# Patient Record
Sex: FEMALE | Race: WHITE | HISPANIC OR LATINO | URBAN - METROPOLITAN AREA
[De-identification: names, ages, dates, MRNs, and addresses within clinical notes are randomized per-mention and may not be internally consistent; named-entity substitution may affect disease eponyms.]

---

## 2018-08-17 ENCOUNTER — INPATIENT (INPATIENT)
Facility: HOSPITAL | Age: 83
LOS: 3 days | Discharge: HOME HEALTH | End: 2018-08-21
Attending: INTERNAL MEDICINE | Admitting: INTERNAL MEDICINE
Payer: MEDICARE

## 2018-08-17 VITALS
DIASTOLIC BLOOD PRESSURE: 62 MMHG | SYSTOLIC BLOOD PRESSURE: 106 MMHG | RESPIRATION RATE: 18 BRPM | HEART RATE: 91 BPM | TEMPERATURE: 98 F | OXYGEN SATURATION: 99 %

## 2018-08-17 DIAGNOSIS — K92.2 GASTROINTESTINAL HEMORRHAGE, UNSPECIFIED: ICD-10-CM

## 2018-08-17 DIAGNOSIS — Z96.649 PRESENCE OF UNSPECIFIED ARTIFICIAL HIP JOINT: Chronic | ICD-10-CM

## 2018-08-17 DIAGNOSIS — D25.9 LEIOMYOMA OF UTERUS, UNSPECIFIED: Chronic | ICD-10-CM

## 2018-08-17 LAB
ALBUMIN SERPL ELPH-MCNC: 3.9 G/DL — SIGNIFICANT CHANGE UP (ref 3.3–5)
ALP SERPL-CCNC: 54 U/L — SIGNIFICANT CHANGE UP (ref 40–120)
ALT FLD-CCNC: 11 U/L — SIGNIFICANT CHANGE UP (ref 4–33)
APTT BLD: 33.7 SEC — SIGNIFICANT CHANGE UP (ref 27.5–37.4)
AST SERPL-CCNC: 17 U/L — SIGNIFICANT CHANGE UP (ref 4–32)
BASE EXCESS BLDV CALC-SCNC: 2.3 MMOL/L — SIGNIFICANT CHANGE UP
BASOPHILS # BLD AUTO: 0.02 K/UL — SIGNIFICANT CHANGE UP (ref 0–0.2)
BASOPHILS NFR BLD AUTO: 0.2 % — SIGNIFICANT CHANGE UP (ref 0–2)
BILIRUB SERPL-MCNC: 0.5 MG/DL — SIGNIFICANT CHANGE UP (ref 0.2–1.2)
BLD GP AB SCN SERPL QL: NEGATIVE — SIGNIFICANT CHANGE UP
BLOOD GAS VENOUS - CREATININE: 0.68 MG/DL — SIGNIFICANT CHANGE UP (ref 0.5–1.3)
BUN SERPL-MCNC: 16 MG/DL — SIGNIFICANT CHANGE UP (ref 7–23)
CALCIUM SERPL-MCNC: 9.2 MG/DL — SIGNIFICANT CHANGE UP (ref 8.4–10.5)
CHLORIDE BLDV-SCNC: 107 MMOL/L — SIGNIFICANT CHANGE UP (ref 96–108)
CHLORIDE SERPL-SCNC: 100 MMOL/L — SIGNIFICANT CHANGE UP (ref 98–107)
CO2 SERPL-SCNC: 24 MMOL/L — SIGNIFICANT CHANGE UP (ref 22–31)
CREAT SERPL-MCNC: 0.85 MG/DL — SIGNIFICANT CHANGE UP (ref 0.5–1.3)
EOSINOPHIL # BLD AUTO: 0 K/UL — SIGNIFICANT CHANGE UP (ref 0–0.5)
EOSINOPHIL NFR BLD AUTO: 0 % — SIGNIFICANT CHANGE UP (ref 0–6)
GAS PNL BLDV: 135 MMOL/L — LOW (ref 136–146)
GLUCOSE BLDV-MCNC: 110 — HIGH (ref 70–99)
GLUCOSE SERPL-MCNC: 111 MG/DL — HIGH (ref 70–99)
HCO3 BLDV-SCNC: 25 MMOL/L — SIGNIFICANT CHANGE UP (ref 20–27)
HCT VFR BLD CALC: 34.7 % — SIGNIFICANT CHANGE UP (ref 34.5–45)
HCT VFR BLD CALC: 38.5 % — SIGNIFICANT CHANGE UP (ref 34.5–45)
HCT VFR BLD CALC: 40.9 % — SIGNIFICANT CHANGE UP (ref 34.5–45)
HCT VFR BLDV CALC: 41.1 % — SIGNIFICANT CHANGE UP (ref 34.5–45)
HGB BLD-MCNC: 11.1 G/DL — LOW (ref 11.5–15.5)
HGB BLD-MCNC: 12.4 G/DL — SIGNIFICANT CHANGE UP (ref 11.5–15.5)
HGB BLD-MCNC: 13.1 G/DL — SIGNIFICANT CHANGE UP (ref 11.5–15.5)
HGB BLDV-MCNC: 13.4 G/DL — SIGNIFICANT CHANGE UP (ref 11.5–15.5)
IMM GRANULOCYTES # BLD AUTO: 0.03 # — SIGNIFICANT CHANGE UP
IMM GRANULOCYTES NFR BLD AUTO: 0.3 % — SIGNIFICANT CHANGE UP (ref 0–1.5)
INR BLD: 1.07 — SIGNIFICANT CHANGE UP (ref 0.88–1.17)
LACTATE BLDV-MCNC: 3.3 MMOL/L — HIGH (ref 0.5–2)
LYMPHOCYTES # BLD AUTO: 1.85 K/UL — SIGNIFICANT CHANGE UP (ref 1–3.3)
LYMPHOCYTES # BLD AUTO: 18.4 % — SIGNIFICANT CHANGE UP (ref 13–44)
MCHC RBC-ENTMCNC: 27.5 PG — SIGNIFICANT CHANGE UP (ref 27–34)
MCHC RBC-ENTMCNC: 28 PG — SIGNIFICANT CHANGE UP (ref 27–34)
MCHC RBC-ENTMCNC: 28.1 PG — SIGNIFICANT CHANGE UP (ref 27–34)
MCHC RBC-ENTMCNC: 32 % — SIGNIFICANT CHANGE UP (ref 32–36)
MCHC RBC-ENTMCNC: 32 % — SIGNIFICANT CHANGE UP (ref 32–36)
MCHC RBC-ENTMCNC: 32.2 % — SIGNIFICANT CHANGE UP (ref 32–36)
MCV RBC AUTO: 85.9 FL — SIGNIFICANT CHANGE UP (ref 80–100)
MCV RBC AUTO: 86.9 FL — SIGNIFICANT CHANGE UP (ref 80–100)
MCV RBC AUTO: 87.8 FL — SIGNIFICANT CHANGE UP (ref 80–100)
MONOCYTES # BLD AUTO: 0.73 K/UL — SIGNIFICANT CHANGE UP (ref 0–0.9)
MONOCYTES NFR BLD AUTO: 7.3 % — SIGNIFICANT CHANGE UP (ref 2–14)
NEUTROPHILS # BLD AUTO: 7.43 K/UL — HIGH (ref 1.8–7.4)
NEUTROPHILS NFR BLD AUTO: 73.8 % — SIGNIFICANT CHANGE UP (ref 43–77)
NRBC # FLD: 0 — SIGNIFICANT CHANGE UP
PCO2 BLDV: 51 MMHG — SIGNIFICANT CHANGE UP (ref 41–51)
PH BLDV: 7.35 PH — SIGNIFICANT CHANGE UP (ref 7.32–7.43)
PLATELET # BLD AUTO: 160 K/UL — SIGNIFICANT CHANGE UP (ref 150–400)
PLATELET # BLD AUTO: 172 K/UL — SIGNIFICANT CHANGE UP (ref 150–400)
PLATELET # BLD AUTO: 176 K/UL — SIGNIFICANT CHANGE UP (ref 150–400)
PMV BLD: 12.1 FL — SIGNIFICANT CHANGE UP (ref 7–13)
PMV BLD: 12.3 FL — SIGNIFICANT CHANGE UP (ref 7–13)
PMV BLD: 12.4 FL — SIGNIFICANT CHANGE UP (ref 7–13)
PO2 BLDV: 29 MMHG — LOW (ref 35–40)
POTASSIUM BLDV-SCNC: 4 MMOL/L — SIGNIFICANT CHANGE UP (ref 3.4–4.5)
POTASSIUM SERPL-MCNC: 4.4 MMOL/L — SIGNIFICANT CHANGE UP (ref 3.5–5.3)
POTASSIUM SERPL-SCNC: 4.4 MMOL/L — SIGNIFICANT CHANGE UP (ref 3.5–5.3)
PROT SERPL-MCNC: 6.4 G/DL — SIGNIFICANT CHANGE UP (ref 6–8.3)
PROTHROM AB SERPL-ACNC: 11.9 SEC — SIGNIFICANT CHANGE UP (ref 9.8–13.1)
RBC # BLD: 4.04 M/UL — SIGNIFICANT CHANGE UP (ref 3.8–5.2)
RBC # BLD: 4.43 M/UL — SIGNIFICANT CHANGE UP (ref 3.8–5.2)
RBC # BLD: 4.66 M/UL — SIGNIFICANT CHANGE UP (ref 3.8–5.2)
RBC # FLD: 13.6 % — SIGNIFICANT CHANGE UP (ref 10.3–14.5)
RBC # FLD: 13.6 % — SIGNIFICANT CHANGE UP (ref 10.3–14.5)
RBC # FLD: 13.7 % — SIGNIFICANT CHANGE UP (ref 10.3–14.5)
RH IG SCN BLD-IMP: POSITIVE — SIGNIFICANT CHANGE UP
SAO2 % BLDV: 49 % — LOW (ref 60–85)
SODIUM SERPL-SCNC: 140 MMOL/L — SIGNIFICANT CHANGE UP (ref 135–145)
WBC # BLD: 10.06 K/UL — SIGNIFICANT CHANGE UP (ref 3.8–10.5)
WBC # BLD: 8.01 K/UL — SIGNIFICANT CHANGE UP (ref 3.8–10.5)
WBC # BLD: 8.18 K/UL — SIGNIFICANT CHANGE UP (ref 3.8–10.5)
WBC # FLD AUTO: 10.06 K/UL — SIGNIFICANT CHANGE UP (ref 3.8–10.5)
WBC # FLD AUTO: 8.01 K/UL — SIGNIFICANT CHANGE UP (ref 3.8–10.5)
WBC # FLD AUTO: 8.18 K/UL — SIGNIFICANT CHANGE UP (ref 3.8–10.5)

## 2018-08-17 PROCEDURE — 99223 1ST HOSP IP/OBS HIGH 75: CPT | Mod: GC

## 2018-08-17 RX ORDER — CIPROFLOXACIN LACTATE 400MG/40ML
VIAL (ML) INTRAVENOUS
Qty: 0 | Refills: 0 | Status: DISCONTINUED | OUTPATIENT
Start: 2018-08-17 | End: 2018-08-21

## 2018-08-17 RX ORDER — CIPROFLOXACIN LACTATE 400MG/40ML
400 VIAL (ML) INTRAVENOUS ONCE
Qty: 0 | Refills: 0 | Status: COMPLETED | OUTPATIENT
Start: 2018-08-17 | End: 2018-08-17

## 2018-08-17 RX ORDER — SODIUM CHLORIDE 9 MG/ML
1000 INJECTION, SOLUTION INTRAVENOUS
Qty: 0 | Refills: 0 | Status: DISCONTINUED | OUTPATIENT
Start: 2018-08-17 | End: 2018-08-19

## 2018-08-17 RX ORDER — DEXTROSE 50 % IN WATER 50 %
12.5 SYRINGE (ML) INTRAVENOUS ONCE
Qty: 0 | Refills: 0 | Status: DISCONTINUED | OUTPATIENT
Start: 2018-08-17 | End: 2018-08-19

## 2018-08-17 RX ORDER — METRONIDAZOLE 500 MG
500 TABLET ORAL EVERY 8 HOURS
Qty: 0 | Refills: 0 | Status: DISCONTINUED | OUTPATIENT
Start: 2018-08-17 | End: 2018-08-21

## 2018-08-17 RX ORDER — INSULIN LISPRO 100/ML
VIAL (ML) SUBCUTANEOUS EVERY 6 HOURS
Qty: 0 | Refills: 0 | Status: DISCONTINUED | OUTPATIENT
Start: 2018-08-17 | End: 2018-08-18

## 2018-08-17 RX ORDER — METRONIDAZOLE 500 MG
500 TABLET ORAL ONCE
Qty: 0 | Refills: 0 | Status: COMPLETED | OUTPATIENT
Start: 2018-08-17 | End: 2018-08-17

## 2018-08-17 RX ORDER — DEXTROSE 50 % IN WATER 50 %
25 SYRINGE (ML) INTRAVENOUS ONCE
Qty: 0 | Refills: 0 | Status: DISCONTINUED | OUTPATIENT
Start: 2018-08-17 | End: 2018-08-21

## 2018-08-17 RX ORDER — PANTOPRAZOLE SODIUM 20 MG/1
40 TABLET, DELAYED RELEASE ORAL ONCE
Qty: 0 | Refills: 0 | Status: COMPLETED | OUTPATIENT
Start: 2018-08-17 | End: 2018-08-17

## 2018-08-17 RX ORDER — DEXTROSE 50 % IN WATER 50 %
15 SYRINGE (ML) INTRAVENOUS ONCE
Qty: 0 | Refills: 0 | Status: DISCONTINUED | OUTPATIENT
Start: 2018-08-17 | End: 2018-08-17

## 2018-08-17 RX ORDER — SODIUM CHLORIDE 9 MG/ML
1000 INJECTION, SOLUTION INTRAVENOUS
Qty: 0 | Refills: 0 | Status: DISCONTINUED | OUTPATIENT
Start: 2018-08-17 | End: 2018-08-18

## 2018-08-17 RX ORDER — METRONIDAZOLE 500 MG
TABLET ORAL
Qty: 0 | Refills: 0 | Status: DISCONTINUED | OUTPATIENT
Start: 2018-08-17 | End: 2018-08-21

## 2018-08-17 RX ORDER — SODIUM CHLORIDE 9 MG/ML
1000 INJECTION, SOLUTION INTRAVENOUS
Qty: 0 | Refills: 0 | Status: DISCONTINUED | OUTPATIENT
Start: 2018-08-17 | End: 2018-08-20

## 2018-08-17 RX ORDER — ENOXAPARIN SODIUM 100 MG/ML
40 INJECTION SUBCUTANEOUS DAILY
Qty: 0 | Refills: 0 | Status: DISCONTINUED | OUTPATIENT
Start: 2018-08-17 | End: 2018-08-17

## 2018-08-17 RX ORDER — DEXTROSE 50 % IN WATER 50 %
15 SYRINGE (ML) INTRAVENOUS ONCE
Qty: 0 | Refills: 0 | Status: COMPLETED | OUTPATIENT
Start: 2018-08-17 | End: 2018-08-17

## 2018-08-17 RX ORDER — SODIUM CHLORIDE 9 MG/ML
1000 INJECTION, SOLUTION INTRAVENOUS
Qty: 0 | Refills: 0 | Status: DISCONTINUED | OUTPATIENT
Start: 2018-08-17 | End: 2018-08-17

## 2018-08-17 RX ORDER — DEXTROSE 50 % IN WATER 50 %
15 SYRINGE (ML) INTRAVENOUS ONCE
Qty: 0 | Refills: 0 | Status: DISCONTINUED | OUTPATIENT
Start: 2018-08-17 | End: 2018-08-19

## 2018-08-17 RX ORDER — DEXTROSE 50 % IN WATER 50 %
25 SYRINGE (ML) INTRAVENOUS ONCE
Qty: 0 | Refills: 0 | Status: DISCONTINUED | OUTPATIENT
Start: 2018-08-17 | End: 2018-08-19

## 2018-08-17 RX ORDER — CIPROFLOXACIN LACTATE 400MG/40ML
400 VIAL (ML) INTRAVENOUS EVERY 12 HOURS
Qty: 0 | Refills: 0 | Status: DISCONTINUED | OUTPATIENT
Start: 2018-08-17 | End: 2018-08-21

## 2018-08-17 RX ORDER — GLUCAGON INJECTION, SOLUTION 0.5 MG/.1ML
1 INJECTION, SOLUTION SUBCUTANEOUS ONCE
Qty: 0 | Refills: 0 | Status: DISCONTINUED | OUTPATIENT
Start: 2018-08-17 | End: 2018-08-19

## 2018-08-17 RX ADMIN — Medication 200 MILLIGRAM(S): at 23:35

## 2018-08-17 RX ADMIN — SODIUM CHLORIDE 125 MILLILITER(S): 9 INJECTION, SOLUTION INTRAVENOUS at 21:23

## 2018-08-17 RX ADMIN — Medication 100 MILLIGRAM(S): at 21:18

## 2018-08-17 RX ADMIN — PANTOPRAZOLE SODIUM 40 MILLIGRAM(S): 20 TABLET, DELAYED RELEASE ORAL at 05:55

## 2018-08-17 RX ADMIN — Medication 100 MILLIGRAM(S): at 12:55

## 2018-08-17 RX ADMIN — SODIUM CHLORIDE 50 MILLILITER(S): 9 INJECTION, SOLUTION INTRAVENOUS at 19:11

## 2018-08-17 RX ADMIN — SODIUM CHLORIDE 125 MILLILITER(S): 9 INJECTION, SOLUTION INTRAVENOUS at 11:16

## 2018-08-17 RX ADMIN — Medication 15 GRAM(S): at 13:04

## 2018-08-17 RX ADMIN — Medication 200 MILLIGRAM(S): at 12:55

## 2018-08-17 RX ADMIN — SODIUM CHLORIDE 125 MILLILITER(S): 9 INJECTION, SOLUTION INTRAVENOUS at 10:35

## 2018-08-17 NOTE — H&P ADULT - NSHPPHYSICALEXAM_GEN_ALL_CORE
Vital Signs Last 24 Hrs  T(C): 37.1 (17 Aug 2018 02:39), Max: 37.1 (17 Aug 2018 02:39)  T(F): 98.7 (17 Aug 2018 02:39), Max: 98.7 (17 Aug 2018 02:39)  HR: 91 (17 Aug 2018 00:28) (91 - 91)  BP: 102/55 (17 Aug 2018 02:39) (102/55 - 106/62)  BP(mean): --  RR: 18 (17 Aug 2018 02:39) (18 - 18)  SpO2: 97% (17 Aug 2018 02:39) (97% - 99%)  Daily     Daily     Exam:  General: awake, alert, NAD  HEENT: NCAT, MMM  Resp: nonlabored  Chest: equal chest rise  Abd: soft, NT, ND  Ext: CHA  Neuro: intact  Rectal: good tone, soft stool in vault, small amount of dark red blood

## 2018-08-17 NOTE — H&P ADULT - ASSESSMENT
89yo F transferred from [facility] for lower GI bleed, hemodynamically stable    - NPO, IVF  - please check stat labs  - IR consult for positive CTA  - GI consult  - dispo pending further work up    will discuss with Dr. Edwar PINEDA team surgery  [id number]

## 2018-08-17 NOTE — CONSULT NOTE ADULT - ASSESSMENT
89yo F transferred from [facility] for lower GI bleed, hemodynamically stable    - NPO, IVF  - please check stat labs  - IR consult for positive CTA  - GI consult  - dispo pending further work up    will discuss with Dr. Edwar PINEDA team surgery  [phone]

## 2018-08-17 NOTE — CONSULT NOTE ADULT - ATTENDING COMMENTS
I saw and examined the patient and agree with the above note.  -Descending colonic bleed with a relatively stable hemodynamics. She does have hypertension at baseline so "normotensive" may be low for her. She will be admitted for serial hematocrits, frequent vitals and discussion of IR or surgical intervention if needed.   -DM AC/HS, corrective sale as needed  - HTN hold antihypertensives    I spent 115min reviewing history, data, images and in discussion/coordination of care.     Kashmir Vann MD (Cell: [phone])  Acute and Critical Care Surgery    The Acute Care Surgery (B Team) Attending Group Practice:  Dr. Tk Bryant, Dr. Tanya Barker, Dr. Jose Spring, Dr. Lashell Larkin, Dr. Kashmir Vann    Urgent issues - spectra [phone] or [phone]  Nonurgent issues - [phone]  Patient appointments or after hours - [phone]

## 2018-08-17 NOTE — ED ADULT NURSE NOTE - CHIEF COMPLAINT QUOTE
Pt BIBA from [place] for gi bleed.  Pt is primarily Canadian speaking.  pacific  used Id # [id number].  HGB 15.6 at [place].  Pt c/o "RLQ burning pain."  Last BM 1 pm with BRBPR as per pt.  Pt b/l 20g AC.  Pt NAD

## 2018-08-17 NOTE — H&P ADULT - NSHPLABSRESULTS_GEN_ALL_CORE
13.1   10.06 )-----------( 176      ( 17 Aug 2018 04:40 )             40.9     08-17    140  |  100  |  16  ----------------------------<  111<H>  4.4   |  24  |  0.85    Ca    9.2      17 Aug 2018 04:40    TPro  6.4  /  Alb  3.9  /  TBili  0.5  /  DBili  x   /  AST  17  /  ALT  11  /  AlkPhos  54  08-17    PT/INR - ( 17 Aug 2018 04:40 )   PT: 11.9 SEC;   INR: 1.07          PTT - ( 17 Aug 2018 04:40 )  PTT:33.7 SEC      IMAGING STUDIES:

## 2018-08-17 NOTE — ED ADULT TRIAGE NOTE - CHIEF COMPLAINT QUOTE
Pt BIBA from [place] for gi bleed.  Pt is primarily Macanese speaking.  pacific  used Id # [id number].  HGB 15.6 at [place].  Pt c/o "RLQ burning pain."  Last BM 1 pm with BRBPR as per pt.  Pt b/l 20g AC.  Pt NAD

## 2018-08-17 NOTE — PROVIDER CONTACT NOTE (OTHER) - ASSESSMENT
Patient is A&Ox 4, denies pain/SOB. VS stable. Out of bed, ambulating with x1 person assistance. Pt is NPO

## 2018-08-17 NOTE — ED PROVIDER NOTE - CARE PLAN
Principal Discharge DX:	GIB (gastrointestinal bleeding)  Secondary Diagnosis:	Colitis  Secondary Diagnosis:	Diverticulitis

## 2018-08-17 NOTE — H&P ADULT - HISTORY OF PRESENT ILLNESS
Palestinian speaking - Pacific interpreters used    89yo F with PMH of htn and DM presents as a transfer from [place] for IR Evaluation due to CTA positive for lower GI bleed. Pt reports a bloody BM yesterday morning so went to OSH. No lightheadedness, shortness of breath, chest pain, nausea, vomiting, fever, chills, sick contacts. No history of GI bleed in the past and no previous colonoscopy. No abdominal pain but patient reports feeling bloated and gassy 4 days ago. She took alkaseltzer with no improvement. This morning patient saw blood in toilet so went to [facility] ED. work up there revealed WBC 5.4, H/h 15.6/50, platelets 193. CTA done showing colitis, possible diverticulitis, and GI bleed from descending colon. Surgery notified of patients arrival at 4:30a.    MEDICATIONS  home: something for BP and diabetes unsure of what. no aspirin, plavix or anticoagulants

## 2018-08-17 NOTE — CONSULT NOTE ADULT - SUBJECTIVE AND OBJECTIVE BOX
Malaysian speaking - Pacific interpreters used    89yo F with PMH of htn and DM presents as a transfer from [place] for IR Evaluation due to CTA positive for lower GI bleed. Pt reports a bloody BM yesterday morning so went to OSH. No lightheadedness, shortness of breath, chest pain, nausea, vomiting, fever, chills, sick contacts. Danish speaking - Pacific interpreters used    89yo F with PMH of htn and DM presents as a transfer from [place] for IR Evaluation due to CTA positive for lower GI bleed. Pt reports a bloody BM yesterday morning so went to OSH. No lightheadedness, shortness of breath, chest pain, nausea, vomiting, fever, chills, sick contacts. No history of GI bleed in the past and no previous colonoscopy. No abdominal pain but patient reports feeling bloated and gassy 4 days ago. She took alkaseltzer with no improvement. This morning patient saw blood in toilet so went to [facility] ED. work up there revealed WBC 5.4, H/h 15.6/50, platelets 193. CTA done showing colitis, possible diverticulitis, and GI bleed from descending colon. Surgery notified of patients arrival at 4:30a.      PAST MEDICAL & SURGICAL HISTORY:  DM, HTN, hip replacement, fibroid rsxn  [  ] No significant past history as reviewed with the patient and family    FAMILY HISTORY:    [  ] Family history not pertinent as reviewed with the patient and family    SOCIAL HISTORY:     MEDICATIONS  (STANDING):  pantoprazole  Injectable 40 milliGRAM(s) IV Push Once    MEDICATIONS  home: something for BP and diabetes unsure of what. no aspirin, plavix or anticoagulants    Allergies    penicillin (Unknown)    Intolerances        Vital Signs Last 24 Hrs  T(C): 37.1 (17 Aug 2018 02:39), Max: 37.1 (17 Aug 2018 02:39)  T(F): 98.7 (17 Aug 2018 02:39), Max: 98.7 (17 Aug 2018 02:39)  HR: 91 (17 Aug 2018 00:28) (91 - 91)  BP: 102/55 (17 Aug 2018 02:39) (102/55 - 106/62)  BP(mean): --  RR: 18 (17 Aug 2018 02:39) (18 - 18)  SpO2: 97% (17 Aug 2018 02:39) (97% - 99%)  Daily     Daily     Exam:  General: awake, alert, NAD  HEENT: NCAT, MMM  Resp: nonlabored  Chest: equal chest rise  Abd: soft, NT, ND  Ext: CHA  Neuro: intact  Rectal: good tone, soft stool in vault, small amount of dark red blood                          13.1   10.06 )-----------( 176      ( 17 Aug 2018 04:40 )             40.9                 IMAGING STUDIES:

## 2018-08-17 NOTE — ED PROVIDER NOTE - MEDICAL DECISION MAKING DETAILS
89y Female with a GI bleed sent from [place] for IR intervention, vitals stable, no lab abnormalities.  Consult IR, admit.

## 2018-08-17 NOTE — ED PROVIDER NOTE - PROGRESS NOTE DETAILS
Crescencio Barakat, PGY-1 EM: Patients vitals are stable, H/H 13.1/40.9, patient is resting comfortably. Crescencio Barakat, PGY-1 EM tried contacting IR, no response calling [phone] or [phone].

## 2018-08-17 NOTE — ED PROVIDER NOTE - OBJECTIVE STATEMENT
89y Female with past medical history of HLD, HTN presenting with bright red blood per rectum.  She had a bowel movement this morning that had bright red blood.  She also has periumbilical abdominal pain.  No n/v/d, fevers, chills.  She is a transfer from [place] for IR intervention.   She was found to have signs of active bleeding on CTA.  Her H/H at [place] was 15.6/50.4 with stable vitals.

## 2018-08-17 NOTE — ED ADULT NURSE NOTE - OBJECTIVE STATEMENT
Break coverage: Patient received in trauma B as transfer from [place] for GI bleed. Patient primarily Thai speaking,  services used, MD from surgery at bedside. Patient c/o using the bathroom yesterday and noticing blood in the toilet. Patient denies  any lightheadedness, sob, chest pain. VS as noted. Patient received with 20G IV noted to right and left AC. Will monitor.

## 2018-08-17 NOTE — ED PROVIDER NOTE - CHIEF COMPLAINT
The patient is a 89y Female complaining of The patient is a 89y Female complaining of bright red per rectum

## 2018-08-17 NOTE — ED ADULT NURSE NOTE - INTERVENTIONS DEFINITIONS
Non-slip footwear when patient is off stretcher/Physically safe environment: no spills, clutter or unnecessary equipment/Instruct patient to call for assistance/Stretcher in lowest position, wheels locked, appropriate side rails in place/[place] to call system

## 2018-08-17 NOTE — ED PROVIDER NOTE - ATTENDING CONTRIBUTION TO CARE
89F transferred in stable condition from [facility] for possible IR intervention of retrosigmoid  colonic bleeding seen on CTA a/p. Pt HA stable in ED. H&H stable. No bloody BM in ED. Afebrile. Awake and Alert. Lungs CTA. Heart RRR. Abdomen soft NTND. CN II-XII grossly intact. Moves all extremities without lateralization. Pt admitted to surgery.

## 2018-08-18 LAB
BUN SERPL-MCNC: 8 MG/DL — SIGNIFICANT CHANGE UP (ref 7–23)
CALCIUM SERPL-MCNC: 8.1 MG/DL — LOW (ref 8.4–10.5)
CHLORIDE SERPL-SCNC: 105 MMOL/L — SIGNIFICANT CHANGE UP (ref 98–107)
CO2 SERPL-SCNC: 24 MMOL/L — SIGNIFICANT CHANGE UP (ref 22–31)
CREAT SERPL-MCNC: 0.7 MG/DL — SIGNIFICANT CHANGE UP (ref 0.5–1.3)
GLUCOSE SERPL-MCNC: 85 MG/DL — SIGNIFICANT CHANGE UP (ref 70–99)
HBA1C BLD-MCNC: 4.5 % — SIGNIFICANT CHANGE UP (ref 4–5.6)
HCT VFR BLD CALC: 31.7 % — LOW (ref 34.5–45)
HCT VFR BLD CALC: 33.8 % — LOW (ref 34.5–45)
HCT VFR BLD CALC: 34.1 % — LOW (ref 34.5–45)
HGB BLD-MCNC: 10.1 G/DL — LOW (ref 11.5–15.5)
HGB BLD-MCNC: 10.8 G/DL — LOW (ref 11.5–15.5)
HGB BLD-MCNC: 10.8 G/DL — LOW (ref 11.5–15.5)
MAGNESIUM SERPL-MCNC: 1.8 MG/DL — SIGNIFICANT CHANGE UP (ref 1.6–2.6)
MCHC RBC-ENTMCNC: 27.2 PG — SIGNIFICANT CHANGE UP (ref 27–34)
MCHC RBC-ENTMCNC: 27.5 PG — SIGNIFICANT CHANGE UP (ref 27–34)
MCHC RBC-ENTMCNC: 27.7 PG — SIGNIFICANT CHANGE UP (ref 27–34)
MCHC RBC-ENTMCNC: 31.7 % — LOW (ref 32–36)
MCHC RBC-ENTMCNC: 31.9 % — LOW (ref 32–36)
MCHC RBC-ENTMCNC: 32 % — SIGNIFICANT CHANGE UP (ref 32–36)
MCV RBC AUTO: 85.9 FL — SIGNIFICANT CHANGE UP (ref 80–100)
MCV RBC AUTO: 86 FL — SIGNIFICANT CHANGE UP (ref 80–100)
MCV RBC AUTO: 86.8 FL — SIGNIFICANT CHANGE UP (ref 80–100)
NRBC # FLD: 0 — SIGNIFICANT CHANGE UP
PHOSPHATE SERPL-MCNC: 2.9 MG/DL — SIGNIFICANT CHANGE UP (ref 2.5–4.5)
PLATELET # BLD AUTO: 138 K/UL — LOW (ref 150–400)
PLATELET # BLD AUTO: 161 K/UL — SIGNIFICANT CHANGE UP (ref 150–400)
PLATELET # BLD AUTO: 162 K/UL — SIGNIFICANT CHANGE UP (ref 150–400)
PMV BLD: 11.8 FL — SIGNIFICANT CHANGE UP (ref 7–13)
PMV BLD: 12 FL — SIGNIFICANT CHANGE UP (ref 7–13)
PMV BLD: 12.5 FL — SIGNIFICANT CHANGE UP (ref 7–13)
POTASSIUM SERPL-MCNC: 3.7 MMOL/L — SIGNIFICANT CHANGE UP (ref 3.5–5.3)
POTASSIUM SERPL-SCNC: 3.7 MMOL/L — SIGNIFICANT CHANGE UP (ref 3.5–5.3)
RBC # BLD: 3.65 M/UL — LOW (ref 3.8–5.2)
RBC # BLD: 3.93 M/UL — SIGNIFICANT CHANGE UP (ref 3.8–5.2)
RBC # BLD: 3.97 M/UL — SIGNIFICANT CHANGE UP (ref 3.8–5.2)
RBC # FLD: 13.3 % — SIGNIFICANT CHANGE UP (ref 10.3–14.5)
RBC # FLD: 13.5 % — SIGNIFICANT CHANGE UP (ref 10.3–14.5)
RBC # FLD: 13.5 % — SIGNIFICANT CHANGE UP (ref 10.3–14.5)
RH IG SCN BLD-IMP: POSITIVE — SIGNIFICANT CHANGE UP
SODIUM SERPL-SCNC: 141 MMOL/L — SIGNIFICANT CHANGE UP (ref 135–145)
WBC # BLD: 7.14 K/UL — SIGNIFICANT CHANGE UP (ref 3.8–10.5)
WBC # BLD: 7.37 K/UL — SIGNIFICANT CHANGE UP (ref 3.8–10.5)
WBC # BLD: 8.2 K/UL — SIGNIFICANT CHANGE UP (ref 3.8–10.5)
WBC # FLD AUTO: 7.14 K/UL — SIGNIFICANT CHANGE UP (ref 3.8–10.5)
WBC # FLD AUTO: 7.37 K/UL — SIGNIFICANT CHANGE UP (ref 3.8–10.5)
WBC # FLD AUTO: 8.2 K/UL — SIGNIFICANT CHANGE UP (ref 3.8–10.5)

## 2018-08-18 PROCEDURE — 99222 1ST HOSP IP/OBS MODERATE 55: CPT | Mod: GC

## 2018-08-18 RX ORDER — INSULIN LISPRO 100/ML
VIAL (ML) SUBCUTANEOUS
Qty: 0 | Refills: 0 | Status: DISCONTINUED | OUTPATIENT
Start: 2018-08-18 | End: 2018-08-21

## 2018-08-18 RX ORDER — POTASSIUM CHLORIDE 20 MEQ
10 PACKET (EA) ORAL ONCE
Qty: 0 | Refills: 0 | Status: DISCONTINUED | OUTPATIENT
Start: 2018-08-18 | End: 2018-08-18

## 2018-08-18 RX ORDER — DEXTROSE 50 % IN WATER 50 %
15 SYRINGE (ML) INTRAVENOUS ONCE
Qty: 0 | Refills: 0 | Status: DISCONTINUED | OUTPATIENT
Start: 2018-08-18 | End: 2018-08-21

## 2018-08-18 RX ORDER — DEXTROSE 50 % IN WATER 50 %
12.5 SYRINGE (ML) INTRAVENOUS ONCE
Qty: 0 | Refills: 0 | Status: DISCONTINUED | OUTPATIENT
Start: 2018-08-18 | End: 2018-08-21

## 2018-08-18 RX ORDER — DEXTROSE 50 % IN WATER 50 %
25 SYRINGE (ML) INTRAVENOUS ONCE
Qty: 0 | Refills: 0 | Status: DISCONTINUED | OUTPATIENT
Start: 2018-08-18 | End: 2018-08-19

## 2018-08-18 RX ORDER — SODIUM CHLORIDE 9 MG/ML
1000 INJECTION, SOLUTION INTRAVENOUS
Qty: 0 | Refills: 0 | Status: DISCONTINUED | OUTPATIENT
Start: 2018-08-18 | End: 2018-08-20

## 2018-08-18 RX ORDER — GLUCAGON INJECTION, SOLUTION 0.5 MG/.1ML
1 INJECTION, SOLUTION SUBCUTANEOUS ONCE
Qty: 0 | Refills: 0 | Status: DISCONTINUED | OUTPATIENT
Start: 2018-08-18 | End: 2018-08-21

## 2018-08-18 RX ORDER — DEXTROSE 50 % IN WATER 50 %
25 SYRINGE (ML) INTRAVENOUS ONCE
Qty: 0 | Refills: 0 | Status: DISCONTINUED | OUTPATIENT
Start: 2018-08-18 | End: 2018-08-21

## 2018-08-18 RX ORDER — POTASSIUM CHLORIDE 20 MEQ
10 PACKET (EA) ORAL ONCE
Qty: 0 | Refills: 0 | Status: COMPLETED | OUTPATIENT
Start: 2018-08-18 | End: 2018-08-18

## 2018-08-18 RX ADMIN — Medication 200 MILLIGRAM(S): at 17:45

## 2018-08-18 RX ADMIN — Medication 100 MILLIGRAM(S): at 22:46

## 2018-08-18 RX ADMIN — Medication 100 MILLIEQUIVALENT(S): at 21:21

## 2018-08-18 RX ADMIN — Medication 200 MILLIGRAM(S): at 06:52

## 2018-08-18 RX ADMIN — Medication 100 MILLIGRAM(S): at 13:10

## 2018-08-18 RX ADMIN — SODIUM CHLORIDE 125 MILLILITER(S): 9 INJECTION, SOLUTION INTRAVENOUS at 21:21

## 2018-08-18 RX ADMIN — Medication 100 MILLIGRAM(S): at 05:27

## 2018-08-18 NOTE — CONSULT NOTE ADULT - SUBJECTIVE AND OBJECTIVE BOX
Chief Complaint:  Patient is a 89y old  Female who presents with a chief complaint of hematochezia.       HPI:  89 year old female with HTN and DM presented to the hospital with chief complain of hematochezia for one day.  Patient went to [facility] with 3-4 episodes of bright red blood per rectum. She denied nausea, vomiting, abdominal pain, diarrhea, constipation. She has never had symptoms like this before. She woke up to go to the bathroom and was passing blood instead of stool. She also denies fever, chills, lightheadedness, shortness of breath, chest pain.   Of note, she has never had a colonoscopy. She also had a CT angio done showing colitis, possible diverticulitis, and GI bleed from descending colon.    On my examination, patient was laying comfortably in bed, with family at bedside and she had no complains.     Allergies:  penicillin (Unknown)      Home Medications:   Outpatient Medication Status not yet specified    Hospital Medications:  ciprofloxacin   IVPB 400 milliGRAM(s) IV Intermittent every 12 hours  ciprofloxacin   IVPB      dextrose 40% Gel 15 Gram(s) Oral once PRN  dextrose 5%. 1000 milliLiter(s) IV Continuous <Continuous>  dextrose 5%. 1000 milliLiter(s) IV Continuous <Continuous>  dextrose 50% Injectable 12.5 Gram(s) IV Push once  dextrose 50% Injectable 25 Gram(s) IV Push once  dextrose 50% Injectable 25 Gram(s) IV Push once  glucagon  Injectable 1 milliGRAM(s) IntraMuscular once PRN  insulin lispro (HumaLOG) corrective regimen sliding scale   SubCutaneous every 6 hours  lactated ringers. 1000 milliLiter(s) IV Continuous <Continuous>  metroNIDAZOLE  IVPB      metroNIDAZOLE  IVPB 500 milliGRAM(s) IV Intermittent every 8 hours      PMHX/PSHX:  HTN (hypertension)  Diabetes  Fibroid  History of hip replacement      Family history:    Denies family history of colon cancer, liver cancer, uterine cancer, ovarian cancer, breast cancer, gastric ulcers, colon polyps, gallstones    Social History:   Denies smoking, alcohol, drug use.     ROS:     General:  No wt loss, fevers, chills, night sweats, fatigue,   Eyes:  Good vision, no reported pain  ENT:  No sore throat, pain, runny nose, dysphagia  CV:  No pain, palpitations, hypo/hypertension  Resp:  No dyspnea, cough, tachypnea, wheezing  GI:  See HPI  :  No pain, bleeding, incontinence, nocturia  Muscle:  No pain, weakness  Neuro:  No weakness, tingling, memory problems  Psych:  No fatigue, insomnia, mood problems, depression  Endocrine:  No polyuria, polydipsia, cold/heat intolerance  Heme:  No petechiae, ecchymosis, easy bruisability  Skin:  No rash, edema      PHYSICAL EXAM:     GENERAL:  Appears stated age, well-groomed, well-nourished, no distress  HEENT:  NC/AT,  conjunctivae clear and pink,  no JVD  CHEST:  Full & symmetric excursion, no increased effort, breath sounds clear  HEART:  Regular rhythm, S1, S2, no murmur/rub/S3/S4, no abdominal bruit, no edema  ABDOMEN:  Soft, non-tender, non-distended, normoactive bowel sounds,  no masses ,  EXTREMITIES:  no cyanosis,clubbing or edema  SKIN:  No rash/erythema/ecchymoses/petechiae/wounds/abscess/warm/dry  NEURO:  Alert, oriented    Vital Signs:  Vital Signs Last 24 Hrs  T(C): 36.8 (18 Aug 2018 05:20), Max: 37.1 (17 Aug 2018 17:24)  T(F): 98.3 (18 Aug 2018 05:20), Max: 98.7 (17 Aug 2018 17:24)  HR: 90 (18 Aug 2018 05:20) (80 - 90)  BP: 129/61 (18 Aug 2018 05:20) (99/50 - 129/61)  BP(mean): --  RR: 20 (18 Aug 2018 05:20) (17 - 20)  SpO2: 95% (18 Aug 2018 05:20) (95% - 100%)  Daily Height in cm: 127 (17 Aug 2018 21:12)    Daily Weight in k.7 (18 Aug 2018 01:37)    LABS:                        10.1   7.14  )-----------( 138      ( 18 Aug 2018 05:14 )             31.7     08-18    141  |  105  |  8   ----------------------------<  85  3.7   |  24  |  0.70    Ca    8.1<L>      18 Aug 2018 05:14  Phos  2.9     08-18  Mg     1.8     08-18    TPro  6.4  /  Alb  3.9  /  TBili  0.5  /  DBili  x   /  AST  17  /  ALT  11  /  AlkPhos  54  08-17    LIVER FUNCTIONS - ( 17 Aug 2018 04:40 )  Alb: 3.9 g/dL / Pro: 6.4 g/dL / ALK PHOS: 54 u/L / ALT: 11 u/L / AST: 17 u/L / GGT: x           PT/INR - ( 17 Aug 2018 04:40 )   PT: 11.9 SEC;   INR: 1.07          PTT - ( 17 Aug 2018 04:40 )  PTT:33.7 SEC        Imaging:

## 2018-08-18 NOTE — PROGRESS NOTE ADULT - ASSESSMENT
89yo F transferred from [facility] for lower GI bleed, hemodynamically stable    - NPO, IVF  - Follow H/H  - GI consult, colonoscopy Monday, appreciate reccs  - VTE ppx   - OOB as tolerated  - Obtain CTA imaging from OSH

## 2018-08-18 NOTE — PROGRESS NOTE ADULT - SUBJECTIVE AND OBJECTIVE BOX
SUBJECTIVE:    Patient doing well, no acute events overnight.   Afebrile, VSS.   + Flatus/ - BM   Denies abd pain, N/V/D.     OBJECTIVE:     ** VITAL SIGNS / I&O's **    T(C): 36.7 (08-18-18 @ 09:46), Max: 37.1 (08-17-18 @ 17:24)  T(F): 98.1 (08-18-18 @ 09:46), Max: 98.7 (08-17-18 @ 17:24)  HR: 80 (08-18-18 @ 09:46) (80 - 90)  BP: 133/58 (08-18-18 @ 09:46) (99/50 - 133/58)  RR: 18 (08-18-18 @ 09:46) (17 - 20)  SpO2: 97% (08-18-18 @ 09:46) (95% - 99%)      17 Aug 2018 07:01  -  18 Aug 2018 07:00  --------------------------------------------------------  IN:  Total IN: 0 mL    OUT:    Voided: 800 mL  Total OUT: 800 mL    Total NET: -800 mL          ** PHYSICAL EXAM **    -- CONSTITUTIONAL: AOx3. NAD.   -- ABDOMEN: Soft, non tender, non distended, no guarding, no rebound.       ** LABS **                 10.1   7.14   )----------(  138       ( 18 Aug 2018 05:14 )               31.7      141    |  105    |  8      ----------------------------<  85         ( 18 Aug 2018 05:14 )  3.7     |  24     |  0.70     Ca    8.1        ( 18 Aug 2018 05:14 )  Phos  2.9       ( 18 Aug 2018 05:14 )  Mg     1.8       ( 18 Aug 2018 05:14 )          CAPILLARY BLOOD GLUCOSE

## 2018-08-18 NOTE — CONSULT NOTE ADULT - ASSESSMENT
89 year old female with HTN and DM presented to the hospital with chief complain of hematochezia for one day. CT angio done at an OSH showing colitis, possible diverticulitis, and GI bleed from descending colon.    IMPRESSION  - Hematochezia, could be 2/2 diverticulosis vs colon cancer vs angioectasia vs ulceration on the right side   Hb 13-->10.1, no transfusions, last bowel movement with blood on 8/17am    RECOMMENDATION  - trend CBC, INR  - plan for colonoscopy on Monday  - diet: clear liquid diet on Sunday, NPO after midnight on Sunday  - moviprep in evening on Sunday (ordered by GI fellow)  - supportive care as per primary team 89 year old female with HTN and DM presented to the hospital with chief complain of hematochezia for one day. CT angio done at an OSH showing colitis, possible diverticulitis, and GI bleed from descending colon.    IMPRESSION  - Painless hematochezia, could be 2/2 diverticulosis vs colon cancer vs angioectasia vs ulceration on the right side   Hb 13-->10.1, no transfusions, last bowel movement with blood on 8/17am    RECOMMENDATION  - trend CBC, INR  - plan for colonoscopy on Monday  - diet: clear liquid diet on Sunday, NPO after midnight on Sunday  - moviprep in evening on Sunday (ordered by GI fellow)  - supportive care as per primary team 89 year old female with HTN and DM presented to the hospital with chief complain of hematochezia for one day. CT angio done at an OSH showing colitis, possible diverticulitis, and GI bleed from descending colon.    IMPRESSION  - Painless hematochezia, could be 2/2 diverticulosis vs colon cancer vs angioectasia vs ulceration on the right side   - acute blood loss anemia, Hb 13-->10.1, no transfusions, last bowel movement with blood on 8/17am  - outside hospital CT read: pssible diverticulitis, and colitis; however, no clinical correlate to suggest inflammation - no fever or leukocytosis or abdominal pain. Typically, diverticulitis does not bleed significantly, but non-inflamed diverticuli do. Was started on Cipro/Flagyl.    RECOMMENDATION  - trend CBC, INR  - plan for colonoscopy on Monday  - will review report of CT at OSH; if no clear findings of inflammation, would stop Abx.  - diet: clear liquid diet on Sunday, NPO after midnight on Sunday  - moviprep in evening on Sunday (ordered by GI fellow)  - supportive care as per primary team

## 2018-08-19 LAB
APPEARANCE UR: CLEAR — SIGNIFICANT CHANGE UP
BACTERIA # UR AUTO: NEGATIVE — SIGNIFICANT CHANGE UP
BILIRUB UR-MCNC: NEGATIVE — SIGNIFICANT CHANGE UP
BLOOD UR QL VISUAL: HIGH
BUN SERPL-MCNC: 3 MG/DL — LOW (ref 7–23)
CALCIUM SERPL-MCNC: 8.2 MG/DL — LOW (ref 8.4–10.5)
CHLORIDE SERPL-SCNC: 102 MMOL/L — SIGNIFICANT CHANGE UP (ref 98–107)
CO2 SERPL-SCNC: 25 MMOL/L — SIGNIFICANT CHANGE UP (ref 22–31)
COLOR SPEC: COLORLESS — SIGNIFICANT CHANGE UP
CREAT SERPL-MCNC: 0.65 MG/DL — SIGNIFICANT CHANGE UP (ref 0.5–1.3)
GLUCOSE SERPL-MCNC: 130 MG/DL — HIGH (ref 70–99)
GLUCOSE UR-MCNC: NEGATIVE — SIGNIFICANT CHANGE UP
HCT VFR BLD CALC: 33.5 % — LOW (ref 34.5–45)
HGB BLD-MCNC: 10.6 G/DL — LOW (ref 11.5–15.5)
KETONES UR-MCNC: NEGATIVE — SIGNIFICANT CHANGE UP
LEUKOCYTE ESTERASE UR-ACNC: NEGATIVE — SIGNIFICANT CHANGE UP
MAGNESIUM SERPL-MCNC: 1.8 MG/DL — SIGNIFICANT CHANGE UP (ref 1.6–2.6)
MCHC RBC-ENTMCNC: 27.2 PG — SIGNIFICANT CHANGE UP (ref 27–34)
MCHC RBC-ENTMCNC: 31.6 % — LOW (ref 32–36)
MCV RBC AUTO: 86.1 FL — SIGNIFICANT CHANGE UP (ref 80–100)
NITRITE UR-MCNC: NEGATIVE — SIGNIFICANT CHANGE UP
NRBC # FLD: 0 — SIGNIFICANT CHANGE UP
PH UR: 7.5 — SIGNIFICANT CHANGE UP (ref 5–8)
PHOSPHATE SERPL-MCNC: 2.6 MG/DL — SIGNIFICANT CHANGE UP (ref 2.5–4.5)
PLATELET # BLD AUTO: 155 K/UL — SIGNIFICANT CHANGE UP (ref 150–400)
PMV BLD: 11.8 FL — SIGNIFICANT CHANGE UP (ref 7–13)
POTASSIUM SERPL-MCNC: 3.3 MMOL/L — LOW (ref 3.5–5.3)
POTASSIUM SERPL-SCNC: 3.3 MMOL/L — LOW (ref 3.5–5.3)
PROT UR-MCNC: NEGATIVE — SIGNIFICANT CHANGE UP
RBC # BLD: 3.89 M/UL — SIGNIFICANT CHANGE UP (ref 3.8–5.2)
RBC # FLD: 13.6 % — SIGNIFICANT CHANGE UP (ref 10.3–14.5)
RBC CASTS # UR COMP ASSIST: HIGH (ref 0–?)
SODIUM SERPL-SCNC: 139 MMOL/L — SIGNIFICANT CHANGE UP (ref 135–145)
SP GR SPEC: 1 — SIGNIFICANT CHANGE UP (ref 1–1.04)
SQUAMOUS # UR AUTO: SIGNIFICANT CHANGE UP
UROBILINOGEN FLD QL: NORMAL — SIGNIFICANT CHANGE UP
WBC # BLD: 7.18 K/UL — SIGNIFICANT CHANGE UP (ref 3.8–10.5)
WBC # FLD AUTO: 7.18 K/UL — SIGNIFICANT CHANGE UP (ref 3.8–10.5)
WBC CASTS UR QL COMP ASSIST: NEGATIVE — SIGNIFICANT CHANGE UP
WBC UR QL: SIGNIFICANT CHANGE UP (ref 0–?)

## 2018-08-19 RX ORDER — MAGNESIUM SULFATE 500 MG/ML
1 VIAL (ML) INJECTION ONCE
Qty: 0 | Refills: 0 | Status: COMPLETED | OUTPATIENT
Start: 2018-08-19 | End: 2018-08-19

## 2018-08-19 RX ORDER — SOD SULF/SODIUM/NAHCO3/KCL/PEG
1000 SOLUTION, RECONSTITUTED, ORAL ORAL EVERY 4 HOURS
Qty: 0 | Refills: 0 | Status: COMPLETED | OUTPATIENT
Start: 2018-08-19 | End: 2018-08-19

## 2018-08-19 RX ORDER — POTASSIUM CHLORIDE 20 MEQ
10 PACKET (EA) ORAL
Qty: 0 | Refills: 0 | Status: COMPLETED | OUTPATIENT
Start: 2018-08-19 | End: 2018-08-19

## 2018-08-19 RX ORDER — POTASSIUM PHOSPHATE, MONOBASIC POTASSIUM PHOSPHATE, DIBASIC 236; 224 MG/ML; MG/ML
30 INJECTION, SOLUTION INTRAVENOUS ONCE
Qty: 0 | Refills: 0 | Status: DISCONTINUED | OUTPATIENT
Start: 2018-08-19 | End: 2018-08-19

## 2018-08-19 RX ORDER — POTASSIUM PHOSPHATE, MONOBASIC POTASSIUM PHOSPHATE, DIBASIC 236; 224 MG/ML; MG/ML
15 INJECTION, SOLUTION INTRAVENOUS ONCE
Qty: 0 | Refills: 0 | Status: COMPLETED | OUTPATIENT
Start: 2018-08-19 | End: 2018-08-19

## 2018-08-19 RX ADMIN — Medication 100 GRAM(S): at 14:39

## 2018-08-19 RX ADMIN — Medication 100 MILLIGRAM(S): at 13:14

## 2018-08-19 RX ADMIN — Medication 200 MILLIGRAM(S): at 17:54

## 2018-08-19 RX ADMIN — Medication 100 MILLIEQUIVALENT(S): at 14:39

## 2018-08-19 RX ADMIN — Medication 200 MILLIGRAM(S): at 05:45

## 2018-08-19 RX ADMIN — Medication 10 MILLIGRAM(S): at 17:50

## 2018-08-19 RX ADMIN — Medication 100 MILLIEQUIVALENT(S): at 16:32

## 2018-08-19 RX ADMIN — SODIUM CHLORIDE 125 MILLILITER(S): 9 INJECTION, SOLUTION INTRAVENOUS at 14:39

## 2018-08-19 RX ADMIN — Medication 1000 MILLILITER(S): at 21:30

## 2018-08-19 RX ADMIN — Medication 1000 MILLILITER(S): at 17:51

## 2018-08-19 RX ADMIN — Medication 100 MILLIEQUIVALENT(S): at 19:02

## 2018-08-19 RX ADMIN — Medication 100 MILLIGRAM(S): at 07:02

## 2018-08-19 RX ADMIN — Medication 100 MILLIGRAM(S): at 21:37

## 2018-08-19 RX ADMIN — POTASSIUM PHOSPHATE, MONOBASIC POTASSIUM PHOSPHATE, DIBASIC 62.5 MILLIMOLE(S): 236; 224 INJECTION, SOLUTION INTRAVENOUS at 16:32

## 2018-08-19 NOTE — PROGRESS NOTE ADULT - SUBJECTIVE AND OBJECTIVE BOX
INTERVAL EVENTS:   No overnight events    SUBJECTIVE: Pt seen + examined  c/o dysuria, on cipro/flagyl  no other complaints    ---------------------------------------------------------------------------------------------   VITALS  T(C): 36.8 (08-19-18 @ 05:42), Max: 36.9 (08-18-18 @ 17:07)  HR: 80 (08-19-18 @ 05:42) (72 - 84)  BP: 133/75 (08-19-18 @ 05:42) (109/64 - 149/73)  RR: 18 (08-19-18 @ 05:42) (18 - 18)  SpO2: 95% (08-19-18 @ 05:42) (95% - 99%)  CAPILLARY BLOOD GLUCOSE      POCT Blood Glucose.: 117 mg/dL (19 Aug 2018 05:24)  POCT Blood Glucose.: 113 mg/dL (18 Aug 2018 22:56)  POCT Blood Glucose.: 82 mg/dL (18 Aug 2018 17:33)  POCT Blood Glucose.: 70 mg/dL (18 Aug 2018 12:04)      Is/Os    08-18 @ 07:01  -  08-19 @ 07:00  --------------------------------------------------------  IN:    Oral Fluid: 240 mL  Total IN: 240 mL    OUT:  Total OUT: 0 mL    Total NET: 240 mL          ---------------------------------------------------------------------------------------------   PHYSICAL EXAM: ***  General: NAD, Lying in bed comfortably  Neuro: alert, oriented x3  HEENT: NC/AT, EOMI  Neck: Soft, supple  Cardio: RRR, nml S1/S2  Resp: Good effort, CTA b/l  GI/Abd: Soft, NT/ND, no rebound/guarding, no masses palpated  Vascular: All 4 extremities warm.  Skin: Intact, no breakdown  Lymphatic/Nodes: No palpable lymphadenopathy  Musculoskeletal: All 4 extremities moving spontaneously, no limitations, no LE edema    ---------------------------------------------------------------------------------------------   MEDICATIONS (STANDING): bisacodyl 10 milliGRAM(s) Oral once  ciprofloxacin   IVPB 400 milliGRAM(s) IV Intermittent every 12 hours  ciprofloxacin   IVPB      dextrose 5% + sodium chloride 0.45%. 1000 milliLiter(s) IV Continuous <Continuous>  dextrose 5%. 1000 milliLiter(s) IV Continuous <Continuous>  dextrose 5%. 1000 milliLiter(s) IV Continuous <Continuous>  dextrose 5%. 1000 milliLiter(s) IV Continuous <Continuous>  dextrose 50% Injectable 12.5 Gram(s) IV Push once  dextrose 50% Injectable 25 Gram(s) IV Push once  dextrose 50% Injectable 25 Gram(s) IV Push once  dextrose 50% Injectable 12.5 Gram(s) IV Push once  dextrose 50% Injectable 25 Gram(s) IV Push once  dextrose 50% Injectable 25 Gram(s) IV Push once  insulin lispro (HumaLOG) corrective regimen sliding scale   SubCutaneous three times a day with meals  metroNIDAZOLE  IVPB      metroNIDAZOLE  IVPB 500 milliGRAM(s) IV Intermittent every 8 hours  polyethylene glycol/electrolyte Solution 1000 milliLiter(s) Oral every 4 hours    MEDICATIONS (PRN):dextrose 40% Gel 15 Gram(s) Oral once PRN Blood Glucose LESS THAN 70 milliGRAM(s)/deciliter  dextrose 40% Gel 15 Gram(s) Oral once PRN Blood Glucose LESS THAN 70 milliGRAM(s)/deciliter  glucagon  Injectable 1 milliGRAM(s) IntraMuscular once PRN Glucose LESS THAN 70 milligrams/deciliter  glucagon  Injectable 1 milliGRAM(s) IntraMuscular once PRN Glucose LESS THAN 70 milligrams/deciliter      ---------------------------------------------------------------------------------------------   LABS  CBC (08-19 @ 05:30)                              10.6<L>                         7.18    )----------------(  155        --    % Neutrophils, --    % Lymphocytes, ANC: --                                  33.5<L>  CBC (08-18 @ 23:08)                              10.8<L>                         7.37    )----------------(  161        --    % Neutrophils, --    % Lymphocytes, ANC: --                                  34.1<L>    BMP (08-19 @ 05:30)             139     |  102     |  3<L>  		Ca++ --      Ca 8.2<L>             ---------------------------------( 130<H>		Mg 1.8                3.3<L>  |  25      |  0.65  			Ph 2.6     BMP (08-18 @ 05:14)             141     |  105     |  8     		Ca++ --      Ca 8.1<L>             ---------------------------------( 85    		Mg 1.8                3.7     |  24      |  0.70  			Ph 2.9         ---------------------------------------------------------------------------------------------       ASSESSMENT  89yo F transferred from [facility] for lower GI bleed, hemodynamically stable    PLAN  - UA for dysuria  - c/w clears today, NPO/IVF at midnight  - prep as per GI for scope tomorrow  - VTE ppx   - OOB as tolerated

## 2018-08-19 NOTE — CHART NOTE - NSCHARTNOTEFT_GEN_A_CORE
GI follow up    Plan for colonoscopy tomorrow 8/20/2018  Moviprep today pm, ordered by GI fellow  Clear liquid diet today and NPO after midnight  Please replete K and then re-check CMP, keep K>3.5 for the procedure         Octavio Dunbar, PGY-5  GI fellow  B- [phone]/ [phone]  Please call GI fellow on call after 5pm and on weekends

## 2018-08-20 ENCOUNTER — RESULT REVIEW (OUTPATIENT)
Age: 83
End: 2018-08-20

## 2018-08-20 LAB
BUN SERPL-MCNC: < 2 MG/DL — LOW (ref 7–23)
CALCIUM SERPL-MCNC: 8.5 MG/DL — SIGNIFICANT CHANGE UP (ref 8.4–10.5)
CHLORIDE SERPL-SCNC: 113 MMOL/L — HIGH (ref 98–107)
CO2 SERPL-SCNC: 21 MMOL/L — LOW (ref 22–31)
CREAT SERPL-MCNC: 0.63 MG/DL — SIGNIFICANT CHANGE UP (ref 0.5–1.3)
GLUCOSE SERPL-MCNC: 124 MG/DL — HIGH (ref 70–99)
HCT VFR BLD CALC: 35.4 % — SIGNIFICANT CHANGE UP (ref 34.5–45)
HGB BLD-MCNC: 11.1 G/DL — LOW (ref 11.5–15.5)
LACTATE SERPL-SCNC: 1.7 MMOL/L — SIGNIFICANT CHANGE UP (ref 0.5–2)
MAGNESIUM SERPL-MCNC: 2.2 MG/DL — SIGNIFICANT CHANGE UP (ref 1.6–2.6)
MCHC RBC-ENTMCNC: 27.3 PG — SIGNIFICANT CHANGE UP (ref 27–34)
MCHC RBC-ENTMCNC: 31.4 % — LOW (ref 32–36)
MCV RBC AUTO: 87.2 FL — SIGNIFICANT CHANGE UP (ref 80–100)
NRBC # FLD: 0 — SIGNIFICANT CHANGE UP
PHOSPHATE SERPL-MCNC: 3.1 MG/DL — SIGNIFICANT CHANGE UP (ref 2.5–4.5)
PLATELET # BLD AUTO: 190 K/UL — SIGNIFICANT CHANGE UP (ref 150–400)
PMV BLD: 12 FL — SIGNIFICANT CHANGE UP (ref 7–13)
POTASSIUM SERPL-MCNC: 3.7 MMOL/L — SIGNIFICANT CHANGE UP (ref 3.5–5.3)
POTASSIUM SERPL-SCNC: 3.7 MMOL/L — SIGNIFICANT CHANGE UP (ref 3.5–5.3)
RBC # BLD: 4.06 M/UL — SIGNIFICANT CHANGE UP (ref 3.8–5.2)
RBC # FLD: 13.8 % — SIGNIFICANT CHANGE UP (ref 10.3–14.5)
SODIUM SERPL-SCNC: 141 MMOL/L — SIGNIFICANT CHANGE UP (ref 135–145)
WBC # BLD: 8.3 K/UL — SIGNIFICANT CHANGE UP (ref 3.8–10.5)
WBC # FLD AUTO: 8.3 K/UL — SIGNIFICANT CHANGE UP (ref 3.8–10.5)

## 2018-08-20 PROCEDURE — 99231 SBSQ HOSP IP/OBS SF/LOW 25: CPT | Mod: GC

## 2018-08-20 PROCEDURE — 45380 COLONOSCOPY AND BIOPSY: CPT | Mod: GC

## 2018-08-20 PROCEDURE — 88305 TISSUE EXAM BY PATHOLOGIST: CPT | Mod: 26

## 2018-08-20 RX ORDER — AMLODIPINE BESYLATE 2.5 MG/1
5 TABLET ORAL ONCE
Qty: 0 | Refills: 0 | Status: DISCONTINUED | OUTPATIENT
Start: 2018-08-20 | End: 2018-08-21

## 2018-08-20 RX ORDER — POTASSIUM CHLORIDE 20 MEQ
10 PACKET (EA) ORAL
Qty: 0 | Refills: 0 | Status: COMPLETED | OUTPATIENT
Start: 2018-08-20 | End: 2018-08-20

## 2018-08-20 RX ORDER — ATORVASTATIN CALCIUM 80 MG/1
10 TABLET, FILM COATED ORAL AT BEDTIME
Qty: 0 | Refills: 0 | Status: DISCONTINUED | OUTPATIENT
Start: 2018-08-20 | End: 2018-08-21

## 2018-08-20 RX ORDER — DEXTROSE MONOHYDRATE, SODIUM CHLORIDE, AND POTASSIUM CHLORIDE 50; .745; 4.5 G/1000ML; G/1000ML; G/1000ML
1000 INJECTION, SOLUTION INTRAVENOUS
Qty: 0 | Refills: 0 | Status: DISCONTINUED | OUTPATIENT
Start: 2018-08-20 | End: 2018-08-21

## 2018-08-20 RX ADMIN — Medication 200 MILLIGRAM(S): at 18:43

## 2018-08-20 RX ADMIN — SODIUM CHLORIDE 125 MILLILITER(S): 9 INJECTION, SOLUTION INTRAVENOUS at 05:36

## 2018-08-20 RX ADMIN — Medication 100 MILLIEQUIVALENT(S): at 19:45

## 2018-08-20 RX ADMIN — Medication 100 MILLIGRAM(S): at 22:17

## 2018-08-20 RX ADMIN — ATORVASTATIN CALCIUM 10 MILLIGRAM(S): 80 TABLET, FILM COATED ORAL at 22:17

## 2018-08-20 RX ADMIN — Medication 200 MILLIGRAM(S): at 05:36

## 2018-08-20 RX ADMIN — Medication 100 MILLIGRAM(S): at 05:36

## 2018-08-20 RX ADMIN — DEXTROSE MONOHYDRATE, SODIUM CHLORIDE, AND POTASSIUM CHLORIDE 100 MILLILITER(S): 50; .745; 4.5 INJECTION, SOLUTION INTRAVENOUS at 09:45

## 2018-08-20 RX ADMIN — Medication 100 MILLIEQUIVALENT(S): at 09:34

## 2018-08-20 RX ADMIN — Medication 100 MILLIGRAM(S): at 17:34

## 2018-08-20 NOTE — PROGRESS NOTE ADULT - SUBJECTIVE AND OBJECTIVE BOX
B TEAM SURGERY PROGRESS NOTE    INTERVAL EVENTS:   No overnight events    SUBJECTIVE: Pt seen + examined  No bloody bowel movements. No abdominal pain    ---------------------------------------------------------------------------------------------   VITALS  T(C): 36.9 (08-20-18 @ 05:34), Max: 37.1 (08-19-18 @ 16:43)  HR: 80 (08-20-18 @ 05:34) (80 - 90)  BP: 134/78 (08-20-18 @ 05:34) (134/72 - 153/79)  ABP: --  ABP(mean): --  RR: 16 (08-20-18 @ 05:34) (16 - 20)  SpO2: 100% (08-20-18 @ 05:34) (92% - 100%)  Wt(kg): --  CVP(mm Hg): --      08-19 @ 07:01  -  08-20 @ 07:00  --------------------------------------------------------  IN:    dextrose 5% + sodium chloride 0.45%.: 1075 mL    IV PiggyBack: 400 mL    Oral Fluid: 1240 mL  Total IN: 2715 mL    OUT:    Voided: 450 mL  Total OUT: 450 mL    Total NET: 2265 mL        ---------------------------------------------------------------------------------------------   PHYSICAL EXAM: ***  General: NAD, Lying in bed comfortably  Cardio: RRR, nml S1/S2  Resp: Good effort, CTA b/l  GI/Abd: Soft, NT/ND, no rebound/guarding, no masses palpated  Musculoskeletal: All 4 extremities moving spontaneously, no limitations, no LE edema    ---------------------------------------------------------------------------------------------   MEDICATIONS  (STANDING):  ciprofloxacin   IVPB 400 milliGRAM(s) IV Intermittent every 12 hours  ciprofloxacin   IVPB      dextrose 5% + sodium chloride 0.45% with potassium chloride 20 mEq/L 1000 milliLiter(s) (100 mL/Hr) IV Continuous <Continuous>  dextrose 50% Injectable 25 Gram(s) IV Push once  dextrose 50% Injectable 12.5 Gram(s) IV Push once  dextrose 50% Injectable 25 Gram(s) IV Push once  insulin lispro (HumaLOG) corrective regimen sliding scale   SubCutaneous three times a day with meals  metroNIDAZOLE  IVPB      metroNIDAZOLE  IVPB 500 milliGRAM(s) IV Intermittent every 8 hours  potassium chloride  10 mEq/100 mL IVPB 10 milliEquivalent(s) IV Intermittent every 1 hour    MEDICATIONS  (PRN):  dextrose 40% Gel 15 Gram(s) Oral once PRN Blood Glucose LESS THAN 70 milliGRAM(s)/deciliter  glucagon  Injectable 1 milliGRAM(s) IntraMuscular once PRN Glucose LESS THAN 70 milligrams/deciliter        ---------------------------------------------------------------------------------------------   LABS  CBC (08-20 @ 05:20)                              11.1<L>                         8.30    )----------------(  190        --    % Neutrophils, --    % Lymphocytes, ANC: --                                  35.4                CBC (08-19 @ 05:30)                              10.6<L>                         7.18    )----------------(  155        --    % Neutrophils, --    % Lymphocytes, ANC: --                                  33.5<L>                BMP (08-20 @ 05:20)             141     |  113<H>  |  < 2<L>		Ca++ --      Ca 8.5                ---------------------------------( 124<H>		Mg 2.2                3.7     |  21<L>   |  0.63  			Ph 3.1     BMP (08-19 @ 05:30)             139     |  102     |  3<L>  		Ca++ --      Ca 8.2<L>             ---------------------------------( 130<H>		Mg 1.8                3.3<L>  |  25      |  0.65  			Ph 2.6           ABG (08-20 @ 05:20)      /  /  /  /  / %     Lactate:  1.7           ---------------------------------------------------------------------------------------------

## 2018-08-20 NOTE — PROGRESS NOTE ADULT - ASSESSMENT
ASSESSMENT  89yo F transferred from [facility] for lower GI bleed, hemodynamically stable    PLAN  - Scope by GI planned for today ; f/u post procedure  - NPO today  - c/w cipro/ flagyl  - VTE ppx   - OOB as tolerated

## 2018-08-21 ENCOUNTER — TRANSCRIPTION ENCOUNTER (OUTPATIENT)
Age: 83
End: 2018-08-21

## 2018-08-21 VITALS
SYSTOLIC BLOOD PRESSURE: 124 MMHG | DIASTOLIC BLOOD PRESSURE: 61 MMHG | HEART RATE: 89 BPM | OXYGEN SATURATION: 95 % | TEMPERATURE: 98 F | RESPIRATION RATE: 18 BRPM

## 2018-08-21 LAB
BUN SERPL-MCNC: 3 MG/DL — LOW (ref 7–23)
CALCIUM SERPL-MCNC: 8.4 MG/DL — SIGNIFICANT CHANGE UP (ref 8.4–10.5)
CHLORIDE SERPL-SCNC: 104 MMOL/L — SIGNIFICANT CHANGE UP (ref 98–107)
CO2 SERPL-SCNC: 23 MMOL/L — SIGNIFICANT CHANGE UP (ref 22–31)
CREAT SERPL-MCNC: 0.69 MG/DL — SIGNIFICANT CHANGE UP (ref 0.5–1.3)
GLUCOSE SERPL-MCNC: 97 MG/DL — SIGNIFICANT CHANGE UP (ref 70–99)
HCT VFR BLD CALC: 34.3 % — LOW (ref 34.5–45)
HGB BLD-MCNC: 10.9 G/DL — LOW (ref 11.5–15.5)
MAGNESIUM SERPL-MCNC: 1.8 MG/DL — SIGNIFICANT CHANGE UP (ref 1.6–2.6)
MCHC RBC-ENTMCNC: 27.4 PG — SIGNIFICANT CHANGE UP (ref 27–34)
MCHC RBC-ENTMCNC: 31.8 % — LOW (ref 32–36)
MCV RBC AUTO: 86.2 FL — SIGNIFICANT CHANGE UP (ref 80–100)
NRBC # FLD: 0 — SIGNIFICANT CHANGE UP
PHOSPHATE SERPL-MCNC: 2.6 MG/DL — SIGNIFICANT CHANGE UP (ref 2.5–4.5)
PLATELET # BLD AUTO: 197 K/UL — SIGNIFICANT CHANGE UP (ref 150–400)
PMV BLD: 12 FL — SIGNIFICANT CHANGE UP (ref 7–13)
POTASSIUM SERPL-MCNC: 4.2 MMOL/L — SIGNIFICANT CHANGE UP (ref 3.5–5.3)
POTASSIUM SERPL-SCNC: 4.2 MMOL/L — SIGNIFICANT CHANGE UP (ref 3.5–5.3)
RBC # BLD: 3.98 M/UL — SIGNIFICANT CHANGE UP (ref 3.8–5.2)
RBC # FLD: 13.8 % — SIGNIFICANT CHANGE UP (ref 10.3–14.5)
SODIUM SERPL-SCNC: 136 MMOL/L — SIGNIFICANT CHANGE UP (ref 135–145)
WBC # BLD: 15.65 K/UL — HIGH (ref 3.8–10.5)
WBC # FLD AUTO: 15.65 K/UL — HIGH (ref 3.8–10.5)

## 2018-08-21 PROCEDURE — 99232 SBSQ HOSP IP/OBS MODERATE 35: CPT | Mod: GC

## 2018-08-21 RX ORDER — POLYETHYLENE GLYCOL 3350 17 G/17G
17 POWDER, FOR SOLUTION ORAL
Qty: 0 | Refills: 0 | COMMUNITY

## 2018-08-21 RX ORDER — AMLODIPINE BESYLATE 2.5 MG/1
1 TABLET ORAL
Qty: 0 | Refills: 0 | COMMUNITY
Start: 2018-08-21

## 2018-08-21 RX ORDER — MAGNESIUM SULFATE 500 MG/ML
2 VIAL (ML) INJECTION ONCE
Qty: 0 | Refills: 0 | Status: COMPLETED | OUTPATIENT
Start: 2018-08-21 | End: 2018-08-21

## 2018-08-21 RX ORDER — ATORVASTATIN CALCIUM 80 MG/1
1 TABLET, FILM COATED ORAL
Qty: 0 | Refills: 0 | COMMUNITY
Start: 2018-08-21

## 2018-08-21 RX ADMIN — Medication 200 MILLIGRAM(S): at 07:00

## 2018-08-21 RX ADMIN — Medication 100 MILLIGRAM(S): at 05:19

## 2018-08-21 RX ADMIN — Medication 63.75 MILLIMOLE(S): at 11:31

## 2018-08-21 RX ADMIN — Medication 50 GRAM(S): at 08:50

## 2018-08-21 RX ADMIN — Medication 1: at 12:42

## 2018-08-21 NOTE — DISCHARGE NOTE ADULT - ADDITIONAL INSTRUCTIONS
Please call [phone] Dr. Spring's office to make an appointment in 1 week. Please call [phone] Dr. Spring's office to make an appointment in 2 weeks

## 2018-08-21 NOTE — DISCHARGE NOTE ADULT - HOSPITAL COURSE
89yo F with PMH of htn and DM presents to [facility] 8/17 as a transfer from [place] for IR Evaluation due to CTA positive for lower GI bleed. Pt reports a bloody BM day prior to admission so went to OSH. No lightheadedness, shortness of breath, chest pain, nausea, vomiting, fever, chills, sick contacts. No history of GI bleed in the past and no previous colonoscopy. No abdominal pain but patient reports feeling bloated and gassy 4 days ago. She took alkaseltzer with no improvement. This morning patient saw blood in toilet so went to [facility] ED. OSH workup there revealed WBC 5.4, H/H 15.6/50, platelets 193. CTA done showing colitis, possible diverticulitis, and GI bleed from descending colon.     Patient was admitted to the surgical service, made NPO and started on IVF. IV antibiotics were started for possible colitis. Gastroenterology was consulted. Painless hematochezia was thought to possibly be 2/2 diverticulosis vs colon cancer vs angioectasia vs ulceration on the right side.     8/19: Bowel prepped for colonoscopy  8/20: Patient underwent colonoscopy:      - Diverticulosis in the sigmoid colon, in the descending colon, in the transverse colon, in the ascending colon and in the cecum.  - One 3 mm polyp in the sigmoid colon, removed with a cold biopsy forceps. Resected and retrieved.                                                                                                                                                    - Non-bleeding internal hemorrhoids.  - The examined portion of the ileum was normal   - Suspect hematochezia secondary to diverticular bleed, now resolved.    8/21: Diet advanced to regular. Antibiotics discontinued.    Patient was evaluated by physical therapy and______________________    Patient did not have any further bloody bowel movements since HD 1. Hematocrit remained stable, patient was not transfused. Pt currently ambulating, voiding, tolerating a regular diet, without pain. Patient is stable for discharge home to follow up as an outpatient, instructed to call to schedule appointment. 89yo F with PMH of htn and DM presents to [facility] 8/17 as a transfer from [place] for IR Evaluation due to CTA positive for lower GI bleed. Pt reports a bloody BM day prior to admission so went to OSH. No lightheadedness, shortness of breath, chest pain, nausea, vomiting, fever, chills, sick contacts. No history of GI bleed in the past and no previous colonoscopy. No abdominal pain but patient reports feeling bloated and gassy 4 days ago. She took alkaseltzer with no improvement. This morning patient saw blood in toilet so went to [facility] ED. OSH workup there revealed WBC 5.4, H/H 15.6/50, platelets 193. CTA done showing colitis, possible diverticulitis, and GI bleed from descending colon.     Patient was admitted to the surgical service, made NPO and started on IVF. IV antibiotics were started for possible colitis. Gastroenterology was consulted. Painless hematochezia was thought to possibly be 2/2 diverticulosis vs colon cancer vs angioectasia vs ulceration on the right side.     8/19: Bowel prepped for colonoscopy  8/20: Patient underwent colonoscopy:      - Diverticulosis in the sigmoid colon, in the descending colon, in the transverse colon, in the ascending colon and in the cecum.  - One 3 mm polyp in the sigmoid colon, removed with a cold biopsy forceps. Resected and retrieved.                                                                                                                                                    - Non-bleeding internal hemorrhoids.  - The examined portion of the ileum was normal   - Suspect hematochezia secondary to diverticular bleed, now resolved.    8/21: Diet advanced to regular. Antibiotics discontinued.    Patient was evaluated by physical therapy and deemed stable for discharge home with home physical therapy sessions.     Patient did not have any further bloody bowel movements since HD 1. Hematocrit remained stable, patient was not transfused. Pt currently ambulating, voiding, tolerating a regular diet, without pain. Patient is stable for discharge home to follow up as an outpatient, instructed to call to schedule appointment. 89yo F with PMH of htn and DM presents to [facility] 8/17 as a transfer from [place] for IR Evaluation due to CTA positive for lower GI bleed. Pt reports a bloody BM day prior to admission so went to OSH. No lightheadedness, shortness of breath, chest pain, nausea, vomiting, fever, chills, sick contacts. No history of GI bleed in the past and no previous colonoscopy. No abdominal pain but patient reports feeling bloated and gassy 4 days ago. She took alkaseltzer with no improvement. This morning patient saw blood in toilet so went to [facility] ED. OSH workup there revealed WBC 5.4, H/H 15.6/50, platelets 193. CTA done showing colitis, possible diverticulitis, and GI bleed from descending colon.     Patient was admitted to the surgical service, made NPO and started on IVF. IV antibiotics were started for possible colitis. Gastroenterology was consulted. Painless hematochezia was thought to possibly be 2/2 diverticulosis vs colon cancer vs angioectasia vs ulceration on the right side.     8/19: Bowel prepped for colonoscopy  8/20: Patient underwent colonoscopy:      - Diverticulosis in the sigmoid colon, in the descending colon, in the transverse colon, in the ascending colon and in the cecum.  - One 3 mm polyp in the sigmoid colon, removed with a cold biopsy forceps. Resected and retrieved.                                                                                                                                                    - Non-bleeding internal hemorrhoids.  - The examined portion of the ileum was normal   - Suspect hematochezia secondary to diverticular bleed, now resolved.    8/21: Diet advanced to regular. Antibiotics discontinued.    Patient was evaluated by physical therapy and deemed stable for discharge home with home physical therapy sessions.     Patient did not have any further bloody bowel movements since HD 1. Hematocrit remained stable, patient was not transfused. Pt currently ambulating, voiding, tolerating a regular diet, without pain. Patient is stable for discharge home to follow up as an outpatient, instructed to call to schedule appointment.

## 2018-08-21 NOTE — DISCHARGE NOTE ADULT - CARE PROVIDER_API CALL
Jose Spring), Surgery; Surgical Critical Care  [address] [place] [address]  Phone: [phone]  Fax: [phone]

## 2018-08-21 NOTE — DISCHARGE NOTE ADULT - CARE PLAN
Principal Discharge DX:	GIB (gastrointestinal bleeding)  Goal:	Resolution of symptoms  Assessment and plan of treatment:	You were admitted to [facility] for gastrointestinal bleed.  When you left the hospital you were pain free and without any signs of active bleeding.  If you have any abdominal pain, fever, chills, nausea or vomiting, or bleeding please call Dr. Spring's office. Please call Dr. Spring's office to make an appointment in 1 week.  Secondary Diagnosis:	Diabetes  Secondary Diagnosis:	HTN (hypertension) Principal Discharge DX:	GIB (gastrointestinal bleeding)  Goal:	Resolution of symptoms  Assessment and plan of treatment:	You were admitted to [facility] for gastrointestinal bleed.  When you left the hospital you were pain free and without any signs of active bleeding.  If you have any abdominal pain, fever, chills, nausea or vomiting, or bleeding please call Dr. Spring's office. Please call Dr. Spring's office to make an appointment in 1 week.  Secondary Diagnosis:	Diabetes  Assessment and plan of treatment:	- Continue your home medications  - low carbohydrate diet  Secondary Diagnosis:	HTN (hypertension)  Secondary Diagnosis:	Colonic polyp  Assessment and plan of treatment:	- Colonic polyp, removed on 8.20.2018, pending pathology  - Follow-up with gastroenterologists within 2 weeks following discharge  - follow up with GI as outpatient on discharge, call () to schedule an appointment.

## 2018-08-21 NOTE — PROGRESS NOTE ADULT - SUBJECTIVE AND OBJECTIVE BOX
INTERVAL EVENTS:   Scoped yesterday, no acute bleed  tolerating clears    SUBJECTIVE: Pt seen + examined  no complaints    ---------------------------------------------------------------------------------------------   VITALS  T(C): 36.9 (08-21-18 @ 09:41), Max: 36.9 (08-21-18 @ 09:41)  HR: 87 (08-21-18 @ 09:41) (78 - 93)  BP: 119/65 (08-21-18 @ 09:41) (116/64 - 121/67)  RR: 18 (08-21-18 @ 09:41) (16 - 18)  SpO2: 98% (08-21-18 @ 09:41) (90% - 98%)  CAPILLARY BLOOD GLUCOSE      POCT Blood Glucose.: 111 mg/dL (21 Aug 2018 08:28)  POCT Blood Glucose.: 108 mg/dL (20 Aug 2018 21:27)  POCT Blood Glucose.: 95 mg/dL (20 Aug 2018 17:44)      Is/Os    08-20 @ 07:01  -  08-21 @ 07:00  --------------------------------------------------------  IN:    dextrose 5% + sodium chloride 0.45% with potassium chloride 20 mEq/L: 550 mL    IV PiggyBack: 100 mL  Total IN: 650 mL    OUT:    Voided: 100 mL  Total OUT: 100 mL    Total NET: 550 mL      08-21 @ 07:01  -  08-21 @ 11:16  --------------------------------------------------------  IN:  Total IN: 0 mL    OUT:    Voided: 200 mL  Total OUT: 200 mL    Total NET: -200 mL    ---------------------------------------------------------------------------------------------   PHYSICAL EXAM: ***  General: NAD, Lying in bed comfortably  Neuro: alert, oriented x3  Resp: Good effort  GI/Abd: Soft, NT/ND  Musculoskeletal: All 4 extremities moving spontaneously, no limitations    ---------------------------------------------------------------------------------------------   MEDICATIONS (STANDING): amLODIPine   Tablet 5 milliGRAM(s) Oral once  atorvastatin 10 milliGRAM(s) Oral at bedtime  dextrose 5% + sodium chloride 0.45% with potassium chloride 20 mEq/L 1000 milliLiter(s) IV Continuous <Continuous>  dextrose 50% Injectable 25 Gram(s) IV Push once  dextrose 50% Injectable 12.5 Gram(s) IV Push once  dextrose 50% Injectable 25 Gram(s) IV Push once  insulin lispro (HumaLOG) corrective regimen sliding scale   SubCutaneous three times a day with meals  sodium phosphate IVPB 15 milliMole(s) IV Intermittent once    MEDICATIONS (PRN):dextrose 40% Gel 15 Gram(s) Oral once PRN Blood Glucose LESS THAN 70 milliGRAM(s)/deciliter  glucagon  Injectable 1 milliGRAM(s) IntraMuscular once PRN Glucose LESS THAN 70 milligrams/deciliter      ---------------------------------------------------------------------------------------------   LABS  CBC (08-21 @ 04:40)                              10.9<L>                         15.65<H>  )----------------(  197        --    % Neutrophils, --    % Lymphocytes, ANC: --                                  34.3<L>  CBC (08-20 @ 05:20)                              11.1<L>                         8.30    )----------------(  190        --    % Neutrophils, --    % Lymphocytes, ANC: --                                  35.4      BMP (08-21 @ 04:40)             136     |  104     |  3<L>  		Ca++ --      Ca 8.4                ---------------------------------( 97    		Mg 1.8                4.2     |  23      |  0.69  			Ph 2.6     BMP (08-20 @ 05:20)             141     |  113<H>  |  < 2<L>		Ca++ --      Ca 8.5                ---------------------------------( 124<H>		Mg 2.2                3.7     |  21<L>   |  0.63  			Ph 3.1             ABG (08-20 @ 05:20)      /  /  /  /  / %     Lactate:  1.7    ---------------------------------------------------------------------------------------------       ASSESSMENT  89yo F transferred from [facility] for lower GI bleed, now hemodynamically stable    PLAN  - regular diet today  - d/c abx  - PT today  - oob, ambulate as tolerated  - dispo planning

## 2018-08-21 NOTE — PROGRESS NOTE ADULT - ASSESSMENT
89 year old female with HTN and DM presented to the hospital with chief complain of hematochezia for one day. CT angio done at an OSH showing colitis, possible diverticulitis, and GI bleed from descending colon.    IMPRESSION  - Acute blood loss anemia with painless hematochezia, likely 2/2 diverticulosis as seen on colonoscopy 8.20.2018, now resolved   - Colonic polyp, removed on 8.20.2018, pending path   - Internal hemorrhoids     RECOMMENDATION    - trend CBC, INR  - pending pathology results  - regular diet  - start miralax 17gm daily to avoid constipation  - supportive care as per primary team   - patient can follow up with GI as outpatient on discharge ()      Please call us back with questions.

## 2018-08-21 NOTE — DISCHARGE NOTE ADULT - HOME CARE AGENCY
[facility]  for RN and PT visits. Initial RN visit a day after discharge.

## 2018-08-21 NOTE — DISCHARGE NOTE ADULT - DURABLE MEDICAL EQUIPMENT AGENCY
Replaced by [facility] . Rolling Walker will be delivered @ hospital room 743 B.

## 2018-08-21 NOTE — DISCHARGE NOTE ADULT - PLAN OF CARE
Resolution of symptoms You were admitted to [facility] for gastrointestinal bleed.  When you left the hospital you were pain free and without any signs of active bleeding.  If you have any abdominal pain, fever, chills, nausea or vomiting, or bleeding please call Dr. Spring's office. Please call Dr. Spring's office to make an appointment in 1 week. - Continue your home medications  - low carbohydrate diet - Colonic polyp, removed on 8.20.2018, pending pathology  - Follow-up with gastroenterologists within 2 weeks following discharge  - follow up with GI as outpatient on discharge, call () to schedule an appointment.

## 2018-08-21 NOTE — DISCHARGE NOTE ADULT - MEDICATION SUMMARY - MEDICATIONS TO TAKE
I will START or STAY ON the medications listed below when I get home from the hospital:    atorvastatin 10 mg oral tablet  -- 1 tab(s) by mouth once a day (at bedtime)  -- Indication: For cholesterol    amLODIPine 5 mg oral tablet  -- 1 tab(s) by mouth once  -- Indication: For blood pressure    MiraLax oral powder for reconstitution  -- 17 gram(s) by mouth once a day  -- Indication: For prevent constipation

## 2018-08-21 NOTE — PROGRESS NOTE ADULT - SUBJECTIVE AND OBJECTIVE BOX
Chief Complaint:  Patient is a 89y old  Female who presents with a chief complaint of rectal bleeding (21 Aug 2018 09:13)      Interval Events:     Allergies:  penicillin (Unknown)      Hospital Medications:  amLODIPine   Tablet 5 milliGRAM(s) Oral once  atorvastatin 10 milliGRAM(s) Oral at bedtime  dextrose 40% Gel 15 Gram(s) Oral once PRN  dextrose 5% + sodium chloride 0.45% with potassium chloride 20 mEq/L 1000 milliLiter(s) IV Continuous <Continuous>  dextrose 50% Injectable 25 Gram(s) IV Push once  dextrose 50% Injectable 12.5 Gram(s) IV Push once  dextrose 50% Injectable 25 Gram(s) IV Push once  glucagon  Injectable 1 milliGRAM(s) IntraMuscular once PRN  insulin lispro (HumaLOG) corrective regimen sliding scale   SubCutaneous three times a day with meals      PMHX/PSHX:  HTN (hypertension)  Diabetes  Fibroid  History of hip replacement      Family history:      ROS:     General:  No wt loss, fevers, chills, night sweats, fatigue,   Eyes:  Good vision, no reported pain  ENT:  No sore throat, pain, runny nose, dysphagia  CV:  No pain, palpitations, hypo/hypertension  Resp:  No dyspnea, cough, tachypnea, wheezing  GI:  See HPI  :  No pain, bleeding, incontinence, nocturia  Muscle:  No pain, weakness  Neuro:  No weakness, tingling, memory problems  Psych:  No fatigue, insomnia, mood problems, depression  Endocrine:  No polyuria, polydipsia, cold/heat intolerance  Heme:  No petechiae, ecchymosis, easy bruisability  Skin:  No rash, edema      PHYSICAL EXAM:     GENERAL:  Appears stated age, well-groomed, well-nourished, no distress  HEENT:  NC/AT,  conjunctivae clear, sclera -anicteric  CHEST:  Full & symmetric excursion, no increased effort, breath sounds clear  HEART:  Regular rhythm, S1, S2, no murmur/rub/S3/S4,  no edema  ABDOMEN:  Soft, non-tender, non-distended, normoactive bowel sounds,  no masses ,no hepato-splenomegaly,   EXTREMITIES:  no cyanosis,clubbing or edema  SKIN:  No rash/erythema/ecchymoses/petechiae/wounds/abscess/warm/dry  NEURO:  Alert, oriented    Vital Signs:  Vital Signs Last 24 Hrs  T(C): 36.9 (21 Aug 2018 09:41), Max: 36.9 (21 Aug 2018 09:41)  T(F): 98.4 (21 Aug 2018 09:41), Max: 98.4 (21 Aug 2018 09:41)  HR: 87 (21 Aug 2018 09:41) (78 - 93)  BP: 119/65 (21 Aug 2018 09:41) (116/64 - 121/67)  BP(mean): --  RR: 18 (21 Aug 2018 09:41) (16 - 18)  SpO2: 98% (21 Aug 2018 09:41) (90% - 98%)  Daily     Daily     LABS:                        10.9   15.65 )-----------( 197      ( 21 Aug 2018 04:40 )             34.3     08-21    136  |  104  |  3<L>  ----------------------------<  97  4.2   |  23  |  0.69    Ca    8.4      21 Aug 2018 04:40  Phos  2.6     08-21  Mg     1.8     08-21          Imaging:    < from: Colonoscopy (08.20.18 @ 14:21) >  Findings:       The perianal and digital rectal examinations were normal.       There is no endoscopic evidence of bleeding in the entire colon.       Multiple small and large-mouthed diverticula were found in the sigmoid   colon, in the descending colon, in the transverse colon, in the        ascending colon and in the cecum.       A 3 mm polyp was found in the sigmoid colon. The polyp was sessile. The        polyp was removed with a cold biopsy forceps. Resection and retrieval        were complete. Verification of patient identification for the specimen        was done. Estimated blood loss was minimal.       Non-bleeding internal hemorrhoids were found during retroflexion. The        hemorrhoids were large.  No other significant abnormalities were identified in a careful        examination of the remainder of the colon.       The terminal ileum appeared normal.       There is no endoscopic evidence of bleeding in the terminal ileum.                                                              Impression:          - Diverticulosis in the sigmoid colon, in the descending                        colon, in the transverse colon, in the ascending colon                        and in the cecum.                       - One 3 mm polyp in the sigmoid colon, removed with a                        cold biopsy forceps. Resected and retrieved.                       - Non-bleeding internal hemorrhoids.                       - The examined portion of the ileum was normal.                       - Suspect hematochezia secondary to diverticular bleed,                        now resolved.  Recommendation:      - Return patient to hospital wall for ongoing care.                       - Advance diet as tolerated.                       - Await pathology results.                       - Monitor hemoglobin and bowel movements                       - Start Miralax 17g daily to avoid constipation    < end of copied text > Chief Complaint:  Patient is a 89y old  Female who presents with a chief complaint of rectal bleeding (21 Aug 2018 09:13)      Interval Events:   - patient had a colonoscopy yesterday and had no complications. she tolerated the procedure.  - today, denies nausea, vomiting, abdominal pain.  - she has not had any episodes of blood in stool.   - she had some throat discomfort but can swallow without any issues.     Allergies:  penicillin (Unknown)      Hospital Medications:  amLODIPine   Tablet 5 milliGRAM(s) Oral once  atorvastatin 10 milliGRAM(s) Oral at bedtime  dextrose 40% Gel 15 Gram(s) Oral once PRN  dextrose 5% + sodium chloride 0.45% with potassium chloride 20 mEq/L 1000 milliLiter(s) IV Continuous <Continuous>  dextrose 50% Injectable 25 Gram(s) IV Push once  dextrose 50% Injectable 12.5 Gram(s) IV Push once  dextrose 50% Injectable 25 Gram(s) IV Push once  glucagon  Injectable 1 milliGRAM(s) IntraMuscular once PRN  insulin lispro (HumaLOG) corrective regimen sliding scale   SubCutaneous three times a day with meals      PMHX/PSHX:  HTN (hypertension)  Diabetes  Fibroid  History of hip replacement      Family history:      ROS:     General:  No wt loss, fevers, chills, night sweats, fatigue,   Eyes:  Good vision, no reported pain  ENT:  No sore throat, pain, runny nose, dysphagia  CV:  No pain, palpitations, hypo/hypertension  Resp:  No dyspnea, cough, tachypnea, wheezing  GI:  See HPI  :  No pain, bleeding, incontinence, nocturia  Muscle:  No pain, weakness  Neuro:  No weakness, tingling, memory problems  Psych:  No fatigue, insomnia, mood problems, depression  Endocrine:  No polyuria, polydipsia, cold/heat intolerance  Heme:  No petechiae, ecchymosis, easy bruisability  Skin:  No rash, edema      PHYSICAL EXAM:     GENERAL:  Appears stated age, well-groomed, well-nourished, no distress  HEENT:  NC/AT,  conjunctivae clear, sclera -anicteric  CHEST:  Full & symmetric excursion, no increased effort, breath sounds clear  HEART:  Regular rhythm, S1, S2, no murmur/rub/S3/S4,  no edema  ABDOMEN:  Soft, non-tender, non-distended, normoactive bowel sounds,  no masses ,no hepato-splenomegaly,   EXTREMITIES:  no cyanosis,clubbing or edema  SKIN:  No rash/erythema/ecchymoses/petechiae/wounds/abscess/warm/dry  NEURO:  Alert, oriented    Vital Signs:  Vital Signs Last 24 Hrs  T(C): 36.9 (21 Aug 2018 09:41), Max: 36.9 (21 Aug 2018 09:41)  T(F): 98.4 (21 Aug 2018 09:41), Max: 98.4 (21 Aug 2018 09:41)  HR: 87 (21 Aug 2018 09:41) (78 - 93)  BP: 119/65 (21 Aug 2018 09:41) (116/64 - 121/67)  BP(mean): --  RR: 18 (21 Aug 2018 09:41) (16 - 18)  SpO2: 98% (21 Aug 2018 09:41) (90% - 98%)  Daily     Daily     LABS:                        10.9   15.65 )-----------( 197      ( 21 Aug 2018 04:40 )             34.3     08-21    136  |  104  |  3<L>  ----------------------------<  97  4.2   |  23  |  0.69    Ca    8.4      21 Aug 2018 04:40  Phos  2.6     08-21  Mg     1.8     08-21          Imaging:    < from: Colonoscopy (08.20.18 @ 14:21) >  Findings:       The perianal and digital rectal examinations were normal.       There is no endoscopic evidence of bleeding in the entire colon.       Multiple small and large-mouthed diverticula were found in the sigmoid   colon, in the descending colon, in the transverse colon, in the        ascending colon and in the cecum.       A 3 mm polyp was found in the sigmoid colon. The polyp was sessile. The        polyp was removed with a cold biopsy forceps. Resection and retrieval        were complete. Verification of patient identification for the specimen        was done. Estimated blood loss was minimal.       Non-bleeding internal hemorrhoids were found during retroflexion. The        hemorrhoids were large.  No other significant abnormalities were identified in a careful        examination of the remainder of the colon.       The terminal ileum appeared normal.       There is no endoscopic evidence of bleeding in the terminal ileum.                                                              Impression:          - Diverticulosis in the sigmoid colon, in the descending                        colon, in the transverse colon, in the ascending colon                        and in the cecum.                       - One 3 mm polyp in the sigmoid colon, removed with a                        cold biopsy forceps. Resected and retrieved.                       - Non-bleeding internal hemorrhoids.                       - The examined portion of the ileum was normal.                       - Suspect hematochezia secondary to diverticular bleed,                        now resolved.  Recommendation:      - Return patient to hospital wall for ongoing care.                       - Advance diet as tolerated.                       - Await pathology results.                       - Monitor hemoglobin and bowel movements                       - Start Miralax 17g daily to avoid constipation    < end of copied text >

## 2018-08-21 NOTE — DISCHARGE NOTE ADULT - PATIENT PORTAL LINK FT
You can access the [facility] Patient Portal, offered by [facility], by registering with the following website: [email or web address]

## 2018-08-21 NOTE — PROGRESS NOTE ADULT - ATTENDING COMMENTS
I have personally interviewed and examined this patient, reviewed pertinent labs and imaging, and discussed the case with colleagues, residents, and physician assistants on B Team rounds.    Plan  pain resolved, no further bleeding hct stable - for colonoscopy tomorrow    The Acute Care Surgery (B Team) Attending Group Practice:  Dr. Tk Bryant, Dr. Tanya Barker, Dr. Jose Spring, Dr. Lashell Larkin, Dr. Kashmir Vann    urgent issues - spectra [phone] or [phone]  nonurgent issues - [phone]  patient appointments or afterhours - [phone]
Seen and examined.  Chart and note reviewed.  Case discussed with B team    Diverticular bleed  a.  No further episodes observed  b.  Advance diet as tolerated  c.  Discontinue ciprofloxacin, flagyl  d.  HSQ for DVT prophylaxis  e.  Discharge planning.  PT as needed    Resume home medications
I have personally interviewed and examined this patient, reviewed pertinent labs and imaging, and discussed the case with colleagues, residents, and physician assistants on B Team rounds.    Plan  trend hct  CLD  obtain CTA results  fu GI - colonoscopy Monday        The Acute Care Surgery (B Team) Attending Group Practice:  Dr. Tk Bryant, Dr. Tanya Barker, Dr. Jose Spring, Dr. Lashell Larkin, Dr. Kashmir Vann    urgent issues - spectra [phone] or [phone]  nonurgent issues - [phone]  patient appointments or afterhours - [phone]
I have seen and examined this patient with the GI fellow. Agree with above.

## 2018-08-21 NOTE — DISCHARGE NOTE ADULT - CONDITIONS AT DISCHARGE
Discharged to home with home PT.  AOx4, family taking patient.  IV d/c.  Patient education provided.  Safety maintained.  No distress noted.

## 2018-08-22 PROBLEM — Z00.00 ENCOUNTER FOR PREVENTIVE HEALTH EXAMINATION: Status: ACTIVE | Noted: 2018-08-22

## 2018-08-22 LAB — SURGICAL PATHOLOGY STUDY: SIGNIFICANT CHANGE UP

## 2018-10-30 ENCOUNTER — APPOINTMENT (OUTPATIENT)
Dept: GASTROENTEROLOGY | Facility: CLINIC | Age: 83
End: 2018-10-30

## 2020-06-24 NOTE — PHYSICAL THERAPY INITIAL EVALUATION ADULT - LEVEL OF INDEPENDENCE: SIT/SUPINE, REHAB EVAL
supervision Double O-Z Plasty Text: The defect edges were debeveled with a #15 scalpel blade.  Given the location of the defect, shape of the defect and the proximity to free margins a Double O-Z plasty (double transposition flap) was deemed most appropriate.  Using a sterile surgical marker, the appropriate transposition flaps were drawn incorporating the defect and placing the expected incisions within the relaxed skin tension lines where possible. The area thus outlined was incised deep to adipose tissue with a #15 scalpel blade.  The skin margins were undermined to an appropriate distance in all directions utilizing iris scissors.  Hemostasis was achieved with electrocautery.  The flaps were then transposed into place, one clockwise and the other counterclockwise, and anchored with interrupted buried subcutaneous sutures.

## 2022-06-20 NOTE — ED PROVIDER NOTE - RESPIRATORY NEGATIVE STATEMENT, MLM
[FreeTextEntry1] : Osteoporosis \par \par - Treatment options discussed with patient. Pt. chooses to take Fosamax- Medication administration discussed with patient and daughter. Pt. /daughter verbalized understanding. \par \par F/U in 3 months \par All questions and concerns addressed during encounter. Pt. agreed to plan of care. 
no chest pain, no cough, and no shortness of breath.

## 2025-01-22 NOTE — ED ADULT TRIAGE NOTE - NS ED NURSE BANDS TYPE
Subjective   Patient ID: Eli Zavala is a 75 y.o. female who presents for Pain (Scrambler therapy).  Patient is a 75-year-old female who was recommended by Dr. LOCKE for scrambler therapy for her peripheral polyneuropathy.  Patient denotes her bilateral stocking glove pattern below the ankle.  Patient has not been on any gabapentin or Lyrica in the past 72 hours.  She has not applied any creams salves or lotions to the bilateral lower extremities.    Patient reports her symptoms as a bilateral stocking glove below the ankles.  Numbness is a 7 out of 10.  Pt has not had the pain spikes HS has not happened since Tuesday.      Review of Systems   Neurological:  Positive for numbness.       Objective   Physical Exam  Musculoskeletal:        Legs:        Assessment/Plan   Problem List Items Addressed This Visit             ICD-10-CM    Peripheral polyneuropathy - Primary G62.9   TREATMENT PLAN:  Day [ 3 ] of Scrambler Therapy.   Verbal consent obtained.  Session initiated by myself with proper electrode/lead placement to applicable dermatomes.   Patient tolerated treatment well .  Encouraged patient to keep a diary or log of symptoms to monitor for any changes in pain, sensation, etc.    Recommend follow-up as needed.       PROCEDURE NOTE:  Treatment [ 3 ] with Scrambler therapy was initiated by myself.   Verbal consent obtained from patient.  Therapy start time: [ 802  ]   Leads were applied to: [ bilateral L5 and S1 dermatomes ]  therapeutic dose :  [  1500  ]     Patient tolerated treatment [ well  ] .  Pain reported at start of therapy session was about a [ 7/10 n/t ].  Pain reported at end of therapy session;  [ 7/10 n/t noticed left foot sensations ] .  Leads were removed and patient left the office without any difficulty.  60 min total scrambler treatment time.         Jose Guadalupe Carroll PA-C 01/22/25 7:52 AM   
Name band;